# Patient Record
Sex: FEMALE | Race: WHITE | Employment: OTHER | URBAN - METROPOLITAN AREA
[De-identification: names, ages, dates, MRNs, and addresses within clinical notes are randomized per-mention and may not be internally consistent; named-entity substitution may affect disease eponyms.]

---

## 2017-04-20 ENCOUNTER — OFFICE VISIT (OUTPATIENT)
Dept: ORTHOPEDIC SURGERY | Age: 72
End: 2017-04-20

## 2017-04-20 VITALS
HEIGHT: 63 IN | RESPIRATION RATE: 16 BRPM | TEMPERATURE: 99.6 F | WEIGHT: 204 LBS | HEART RATE: 95 BPM | BODY MASS INDEX: 36.14 KG/M2 | SYSTOLIC BLOOD PRESSURE: 108 MMHG | DIASTOLIC BLOOD PRESSURE: 61 MMHG

## 2017-04-20 DIAGNOSIS — M17.11 PRIMARY OSTEOARTHRITIS OF RIGHT KNEE: ICD-10-CM

## 2017-04-20 DIAGNOSIS — Z96.643 H/O TOTAL HIP ARTHROPLASTY, BILATERAL: Primary | ICD-10-CM

## 2017-04-20 DIAGNOSIS — Z96.652 STATUS POST LEFT KNEE REPLACEMENT: ICD-10-CM

## 2017-04-20 PROCEDURE — 1036F TOBACCO NON-USER: CPT | Performed by: ORTHOPAEDIC SURGERY

## 2017-04-20 PROCEDURE — 73522 X-RAY EXAM HIPS BI 3-4 VIEWS: CPT | Performed by: ORTHOPAEDIC SURGERY

## 2017-04-20 PROCEDURE — 20610 DRAIN/INJ JOINT/BURSA W/O US: CPT | Performed by: ORTHOPAEDIC SURGERY

## 2017-04-20 PROCEDURE — G8400 PT W/DXA NO RESULTS DOC: HCPCS | Performed by: ORTHOPAEDIC SURGERY

## 2017-04-20 PROCEDURE — G8419 CALC BMI OUT NRM PARAM NOF/U: HCPCS | Performed by: ORTHOPAEDIC SURGERY

## 2017-04-20 PROCEDURE — 1123F ACP DISCUSS/DSCN MKR DOCD: CPT | Performed by: ORTHOPAEDIC SURGERY

## 2017-04-20 PROCEDURE — 1090F PRES/ABSN URINE INCON ASSESS: CPT | Performed by: ORTHOPAEDIC SURGERY

## 2017-04-20 PROCEDURE — 3014F SCREEN MAMMO DOC REV: CPT | Performed by: ORTHOPAEDIC SURGERY

## 2017-04-20 PROCEDURE — 3017F COLORECTAL CA SCREEN DOC REV: CPT | Performed by: ORTHOPAEDIC SURGERY

## 2017-04-20 PROCEDURE — G8427 DOCREV CUR MEDS BY ELIG CLIN: HCPCS | Performed by: ORTHOPAEDIC SURGERY

## 2017-04-20 PROCEDURE — 4040F PNEUMOC VAC/ADMIN/RCVD: CPT | Performed by: ORTHOPAEDIC SURGERY

## 2017-04-20 PROCEDURE — 99213 OFFICE O/P EST LOW 20 MIN: CPT | Performed by: ORTHOPAEDIC SURGERY

## 2017-04-20 PROCEDURE — 73562 X-RAY EXAM OF KNEE 3: CPT | Performed by: ORTHOPAEDIC SURGERY

## 2017-10-26 ENCOUNTER — OFFICE VISIT (OUTPATIENT)
Dept: ORTHOPEDIC SURGERY | Age: 72
End: 2017-10-26

## 2017-10-26 VITALS
SYSTOLIC BLOOD PRESSURE: 128 MMHG | HEART RATE: 90 BPM | HEIGHT: 63 IN | TEMPERATURE: 99.2 F | DIASTOLIC BLOOD PRESSURE: 66 MMHG | RESPIRATION RATE: 16 BRPM

## 2017-10-26 DIAGNOSIS — M17.11 ARTHRITIS OF RIGHT KNEE: Primary | ICD-10-CM

## 2017-10-26 PROCEDURE — 1090F PRES/ABSN URINE INCON ASSESS: CPT | Performed by: PHYSICIAN ASSISTANT

## 2017-10-26 PROCEDURE — 3017F COLORECTAL CA SCREEN DOC REV: CPT | Performed by: PHYSICIAN ASSISTANT

## 2017-10-26 PROCEDURE — G8484 FLU IMMUNIZE NO ADMIN: HCPCS | Performed by: PHYSICIAN ASSISTANT

## 2017-10-26 PROCEDURE — 3014F SCREEN MAMMO DOC REV: CPT | Performed by: PHYSICIAN ASSISTANT

## 2017-10-26 PROCEDURE — 4040F PNEUMOC VAC/ADMIN/RCVD: CPT | Performed by: PHYSICIAN ASSISTANT

## 2017-10-26 PROCEDURE — 1036F TOBACCO NON-USER: CPT | Performed by: PHYSICIAN ASSISTANT

## 2017-10-26 PROCEDURE — G8427 DOCREV CUR MEDS BY ELIG CLIN: HCPCS | Performed by: PHYSICIAN ASSISTANT

## 2017-10-26 PROCEDURE — 99214 OFFICE O/P EST MOD 30 MIN: CPT | Performed by: PHYSICIAN ASSISTANT

## 2017-10-26 PROCEDURE — 1123F ACP DISCUSS/DSCN MKR DOCD: CPT | Performed by: PHYSICIAN ASSISTANT

## 2017-10-26 PROCEDURE — G8400 PT W/DXA NO RESULTS DOC: HCPCS | Performed by: PHYSICIAN ASSISTANT

## 2017-10-26 PROCEDURE — G8421 BMI NOT CALCULATED: HCPCS | Performed by: PHYSICIAN ASSISTANT

## 2017-10-26 NOTE — LETTER
Supplemental Confidentiality & Payment Agreement & Supplemental HIPAA Notice for Shared Medical Appointments        During shared medical appointments, you will hear about other participants health issues and personal information. As a matter of trust, it is your duty to keep everything you hear confidential.  Nothing that identifies a participant in any way (including job, ethnicity, Mandaen, etc.) can be shared outside of this group setting. I have read and agree to the following statements:        · I agree to meet with a group of patients and my doctor. I understand that I have the choice to be seen by my physician in this group or individually and that I may leave the group visit anytime I feel uncomfortable. · I understand that discussions will occur regarding individual identifiable health information during the shared medical appointment and I will maintain the confidentiality of [facility] health information or other information heard during the appointment. · I am committed to maintaining this confidentiality even if I am no longer participating in shared medical appointments. · I understand that the information I share with the physician and staff will be heard by the other participants and there is a possibility that the information disclosed in the shared medical appointment may be re-disclosed by other participants. I have been notified of this potential disclosure and I voluntarily agree to participate in the shared medical appointment. · I know that I dont have to share any personal information with the group or health care providers unless, I choose to do so. · Like any doctors appointment, I agree to be responsible for the bill and / or co-payment associated with this physician appointment.               Shared Medical Appointment              THIS SUPPLEMENTAL NOTICE SUPPLEMENTS AND DOES NOT REPLACE THE [facility]

## 2017-10-26 NOTE — PROGRESS NOTES
This dictation was done with [employer] dictation and may contain mechanical errors related to translation. Subjective:  Bonne Kayser is a 81 y.o. who is here in follow-up for her right knee. She has ongoing pain at this is becoming more disabling and painful that it's affecting her everyday life. She had an injection a cortisone back on 4/20/2017, but no fractures within the last 5-6 months I did send her for x-rays including a standing AP lateral and a sunrise view. Patient Active Problem List   Diagnosis    Primary localized osteoarthrosis, pelvic region and thigh    Lower urinary tract infectious disease    Leg pain, left    Myalgia    S/P hip replacement    C. difficile diarrhea    Primary localized osteoarthrosis, pelvic region and thigh    H/O total hip arthroplasty    Status post left knee replacement    Arthritis of right knee           Current Outpatient Prescriptions on File Prior to Visit   Medication Sig Dispense Refill    esomeprazole Magnesium (NEXIUM) 20 MG PACK Take 20 mg by mouth daily      ferrous sulfate 220 (44 FE) MG/5ML solution Take 220 mg by mouth daily      Calcium Citrate-Vitamin D (CITRACAL + D PO) Take 1 capsule by mouth daily      rOPINIRole (REQUIP) 0.5 MG tablet Take 0.5 mg by mouth 3 times daily      rasagiline (AZILECT) 0.5 MG TABS Take 0.5 mg by mouth daily      losartan (COZAAR) 100 MG tablet Take 100 mg by mouth daily.  amLODIPine (NORVASC) 5 MG tablet Take 5 mg by mouth daily.  pravastatin (PRAVACHOL) 10 MG tablet Take 10 mg by mouth nightly. No current facility-administered medications on file prior to visit. Objective:   Blood pressure 128/66, pulse 90, temperature 99.2 °F (37.3 °C), temperature source Temporal, resp. rate 16, height 5' 3\" (1.6 m). On examination this is a very pleasant 82-year-old female no acute distress. She is alert and oriented ×3.  She has a lot of crepitus during flexion and extension through the

## 2017-11-27 ENCOUNTER — TELEPHONE (OUTPATIENT)
Dept: ORTHOPEDIC SURGERY | Age: 72
End: 2017-11-27

## 2017-11-27 ENCOUNTER — PAT TELEPHONE (OUTPATIENT)
Dept: PREADMISSION TESTING | Age: 72
End: 2017-11-27

## 2017-11-27 DIAGNOSIS — Z01.818 ENCOUNTER FOR PREADMISSION TESTING: Primary | ICD-10-CM

## 2017-11-28 ENCOUNTER — CARE COORDINATOR VISIT (OUTPATIENT)
Dept: CASE MANAGEMENT | Age: 72
End: 2017-11-28

## 2017-11-28 ENCOUNTER — HOSPITAL ENCOUNTER (OUTPATIENT)
Dept: PREADMISSION TESTING | Age: 72
Discharge: HOME | End: 2017-11-28
Attending: ORTHOPAEDIC SURGERY | Admitting: ORTHOPAEDIC SURGERY

## 2017-11-28 DIAGNOSIS — Z01.818 ENCOUNTER FOR PREADMISSION TESTING: ICD-10-CM

## 2017-11-28 LAB
ABO/RH: NORMAL
ALBUMIN SERPL-MCNC: 4.1 G/DL (ref 3.4–5)
ANION GAP SERPL CALCULATED.3IONS-SCNC: 16 MMOL/L (ref 3–16)
ANTIBODY SCREEN: NORMAL
APTT: 28.7 SEC (ref 24.1–34.9)
BACTERIA: ABNORMAL /HPF
BASOPHILS ABSOLUTE: 0 K/UL (ref 0–0.2)
BASOPHILS RELATIVE PERCENT: 0.6 %
BILIRUBIN URINE: NEGATIVE
BLOOD, URINE: ABNORMAL
BUN BLDV-MCNC: 16 MG/DL (ref 7–20)
CALCIUM SERPL-MCNC: 9.6 MG/DL (ref 8.3–10.6)
CHLORIDE BLD-SCNC: 104 MMOL/L (ref 99–110)
CLARITY: CLEAR
CO2: 26 MMOL/L (ref 21–32)
COLOR: YELLOW
CREAT SERPL-MCNC: 0.7 MG/DL (ref 0.6–1.2)
EKG ATRIAL RATE: 66 BPM
EKG DIAGNOSIS: NORMAL
EKG P AXIS: 42 DEGREES
EKG P-R INTERVAL: 128 MS
EKG Q-T INTERVAL: 422 MS
EKG QRS DURATION: 72 MS
EKG QTC CALCULATION (BAZETT): 442 MS
EKG R AXIS: -6 DEGREES
EKG T AXIS: 17 DEGREES
EKG VENTRICULAR RATE: 66 BPM
EOSINOPHILS ABSOLUTE: 0.1 K/UL (ref 0–0.6)
EOSINOPHILS RELATIVE PERCENT: 1 %
EPITHELIAL CELLS, UA: 3 /HPF (ref 0–5)
ESTIMATED AVERAGE GLUCOSE: 137 MG/DL
GFR AFRICAN AMERICAN: >60
GFR NON-AFRICAN AMERICAN: >60
GLUCOSE BLD-MCNC: 134 MG/DL (ref 70–99)
GLUCOSE URINE: NEGATIVE MG/DL
HBA1C MFR BLD: 6.4 %
HCT VFR BLD CALC: 40.7 % (ref 36–48)
HEMOGLOBIN: 13.7 G/DL (ref 12–16)
HYALINE CASTS: 0 /LPF (ref 0–8)
INR BLD: 0.95 (ref 0.85–1.15)
KETONES, URINE: NEGATIVE MG/DL
LEUKOCYTE ESTERASE, URINE: ABNORMAL
LYMPHOCYTES ABSOLUTE: 1.5 K/UL (ref 1–5.1)
LYMPHOCYTES RELATIVE PERCENT: 18.3 %
MCH RBC QN AUTO: 29.7 PG (ref 26–34)
MCHC RBC AUTO-ENTMCNC: 33.6 G/DL (ref 31–36)
MCV RBC AUTO: 88.5 FL (ref 80–100)
MICROSCOPIC EXAMINATION: YES
MONOCYTES ABSOLUTE: 0.6 K/UL (ref 0–1.3)
MONOCYTES RELATIVE PERCENT: 7.2 %
NEUTROPHILS ABSOLUTE: 5.9 K/UL (ref 1.7–7.7)
NEUTROPHILS RELATIVE PERCENT: 72.9 %
NITRITE, URINE: NEGATIVE
PDW BLD-RTO: 13.1 % (ref 12.4–15.4)
PH UA: 6
PLATELET # BLD: 259 K/UL (ref 135–450)
PMV BLD AUTO: 7.6 FL (ref 5–10.5)
POTASSIUM SERPL-SCNC: 4.3 MMOL/L (ref 3.5–5.1)
PREALBUMIN: 20 MG/DL (ref 20–40)
PROTEIN UA: NEGATIVE MG/DL
PROTHROMBIN TIME: 10.7 SEC (ref 9.6–13)
RBC # BLD: 4.6 M/UL (ref 4–5.2)
RBC UA: 3 /HPF (ref 0–4)
SEDIMENTATION RATE, ERYTHROCYTE: 7 MM/HR (ref 0–30)
SODIUM BLD-SCNC: 146 MMOL/L (ref 136–145)
SPECIFIC GRAVITY UA: 1.02
URINE REFLEX TO CULTURE: YES
URINE TYPE: ABNORMAL
UROBILINOGEN, URINE: 1 E.U./DL
VITAMIN D 25-HYDROXY: 25.2 NG/ML
WBC # BLD: 8.1 K/UL (ref 4–11)
WBC UA: 16 /HPF (ref 0–5)

## 2017-11-28 PROCEDURE — 93010 ELECTROCARDIOGRAM REPORT: CPT | Performed by: INTERNAL MEDICINE

## 2017-11-28 NOTE — CARE COORDINATION
[facility] Joint Replacement Ortho Bundle Transition Interview    Patient Interviewed: Chaim Baron  Informed patient of [facility] Ortho Bundle Program and role of CTS/CTC - yes  CMS Letter Given:  11/28/2017 in JET class    Living Situation:   Living arrangements: with family:  spouse                   Home: 1-story house/ trailer. Ramp to enter the dwelling. Social support:a good social support network    Risk Analysis:  Has the following:   drives   Chronic Illness: Osteoarthritis, h/o uti, and leg pain    Fall Risk & DME:   Any previous falls or injuries in the home? No   Visual Impairment:  Glasses   Difficulty hearing: hard of hearing, Wears hearing aids   Able to express needs/desires? Yes   Home Equipment: shower chair with back, grab bars in the shower, hand held shower, grab bars near the toilet, lift chair, 2 wheel walker. Financial Assessment   Afford medications? Yes   Connected with the following services? none    Mode of transportation? car    Health Literacy Assessment   Does anyone help with medications at home? No  Anything preventing from taking medications at home? No    Anticipated Needs Post Evaluation: Payton's goal at time of d/c is to return home with Neetu Jasmina Garcia is currently independent with ambulation,bathing, dressing,cooking, cleaning, and the laundry.     Follow up appointments:    Future Appointments  Date Time Provider Jose Miguel Ruiz   11/30/2017 1:30 PM Alisha Clifford MD [facility] [place]   12/8/2017 7:35 AM MD Violette Andino   12/8/2017 9:55 AM Alisha Clifford MD [place] [facility] None   12/28/2017 2:00 PM UNA Hoffmann [facility]

## 2017-11-28 NOTE — PROGRESS NOTES
Pt. Attended JET class on 11/28/17. Pt. Verified surgery for Total Knee replacement and received patient information and educational JET folder. Interviews completed by PT, OT, Case management and PAT. Labs and Tests completed as ordered/necessary. Pt. Given instructions on Pre-operative Showering techniques and the use of anti-septic 3 days before surgery. Anti-septic bottle given to patient to take home. Pt. States no further questions or concerns.

## 2017-11-29 LAB — MRSA SCREEN RT-PCR: NORMAL

## 2017-11-30 ENCOUNTER — OFFICE VISIT (OUTPATIENT)
Dept: ORTHOPEDIC SURGERY | Age: 72
End: 2017-11-30

## 2017-11-30 DIAGNOSIS — M17.11 PRIMARY OSTEOARTHRITIS OF RIGHT KNEE: Primary | ICD-10-CM

## 2017-11-30 LAB
ORGANISM: ABNORMAL
URINE CULTURE, ROUTINE: ABNORMAL
URINE CULTURE, ROUTINE: ABNORMAL

## 2017-11-30 PROCEDURE — 4040F PNEUMOC VAC/ADMIN/RCVD: CPT | Performed by: ORTHOPAEDIC SURGERY

## 2017-11-30 PROCEDURE — G8400 PT W/DXA NO RESULTS DOC: HCPCS | Performed by: ORTHOPAEDIC SURGERY

## 2017-11-30 PROCEDURE — G8421 BMI NOT CALCULATED: HCPCS | Performed by: ORTHOPAEDIC SURGERY

## 2017-11-30 PROCEDURE — 1036F TOBACCO NON-USER: CPT | Performed by: ORTHOPAEDIC SURGERY

## 2017-11-30 PROCEDURE — 99214 OFFICE O/P EST MOD 30 MIN: CPT | Performed by: ORTHOPAEDIC SURGERY

## 2017-11-30 PROCEDURE — G8427 DOCREV CUR MEDS BY ELIG CLIN: HCPCS | Performed by: ORTHOPAEDIC SURGERY

## 2017-11-30 PROCEDURE — 1123F ACP DISCUSS/DSCN MKR DOCD: CPT | Performed by: ORTHOPAEDIC SURGERY

## 2017-11-30 PROCEDURE — G8484 FLU IMMUNIZE NO ADMIN: HCPCS | Performed by: ORTHOPAEDIC SURGERY

## 2017-11-30 PROCEDURE — 3014F SCREEN MAMMO DOC REV: CPT | Performed by: ORTHOPAEDIC SURGERY

## 2017-11-30 PROCEDURE — 3017F COLORECTAL CA SCREEN DOC REV: CPT | Performed by: ORTHOPAEDIC SURGERY

## 2017-11-30 PROCEDURE — 1090F PRES/ABSN URINE INCON ASSESS: CPT | Performed by: ORTHOPAEDIC SURGERY

## 2017-11-30 RX ORDER — CIPROFLOXACIN 500 MG/1
500 TABLET, FILM COATED ORAL 2 TIMES DAILY
Qty: 20 TABLET | Refills: 0 | Status: ON HOLD | OUTPATIENT
Start: 2017-11-30 | End: 2017-12-09 | Stop reason: HOSPADM

## 2017-12-07 ENCOUNTER — PAT TELEPHONE (OUTPATIENT)
Dept: PREADMISSION TESTING | Age: 72
End: 2017-12-07

## 2017-12-07 ENCOUNTER — HOSPITAL ENCOUNTER (OUTPATIENT)
Dept: WOMENS IMAGING | Age: 72
Discharge: HOME | End: 2017-12-07
Attending: SURGERY | Admitting: SURGERY

## 2017-12-07 VITALS — BODY MASS INDEX: 36.68 KG/M2 | WEIGHT: 207 LBS | HEIGHT: 63 IN

## 2017-12-07 DIAGNOSIS — Z12.31 VISIT FOR SCREENING MAMMOGRAM: ICD-10-CM

## 2017-12-07 RX ORDER — CELECOXIB 200 MG/1
200 CAPSULE ORAL ONCE
Status: CANCELLED | OUTPATIENT
Start: 2017-12-08

## 2017-12-07 RX ORDER — METFORMIN HYDROCHLORIDE 500 MG/1
1000 TABLET, EXTENDED RELEASE ORAL
COMMUNITY
Start: 2017-08-31

## 2017-12-07 NOTE — PRE-PROCEDURE INSTRUCTIONS
C-Difficile admission screening and protocol:     * Admitted with diarrhea? yes     *Prior history of C-Diff. In last 3 months? yes     *Antibiotic use in the past 6-8 weeks? yes UTI     *Prior hospitalization or nursing home in the last month?    no
you have a living will or durable power of attorny. Please bring in a copy of you advanced directives. · If you have dentures, they will be removed before going to the OR, we will provide you with a container. If you wear contact lenses/glasses, they will be removes, please bring a case    · Have you seen your family doctor for a pre-op history and physical.      · Surgery scheduler will call you 48 hours prior to your surgery to notify you of the time of your surgery and the time you will need to be at hospital...patients are asked to arrive 2 1/2 hours prior to surgery. ·  Please call Pre-Admission testing if you have any further questions.                   [id number] [facility] testing phone number:  [phone]      Thank You for choosing [facility]!!

## 2017-12-08 PROBLEM — M17.11 ARTHRITIS OF RIGHT KNEE: Status: ACTIVE | Noted: 2017-12-08

## 2017-12-10 ENCOUNTER — CARE COORDINATION (OUTPATIENT)
Dept: CASE MANAGEMENT | Age: 72
End: 2017-12-10

## 2017-12-18 ENCOUNTER — TELEPHONE (OUTPATIENT)
Dept: ORTHOPEDIC SURGERY | Age: 72
End: 2017-12-18

## 2017-12-18 RX ORDER — HYDROCODONE BITARTRATE AND ACETAMINOPHEN 5; 325 MG/1; MG/1
1 TABLET ORAL EVERY 6 HOURS PRN
Qty: 60 TABLET | Refills: 0 | Status: SHIPPED | OUTPATIENT
Start: 2017-12-18

## 2017-12-18 NOTE — TELEPHONE ENCOUNTER
Patient needs a refill of Norco 5/325 mg, last filled 12/9/17( patient is allergic to [facility])    Preferred pharmacy- [facility]- [phone]    Patient can be reached at [phone]

## 2017-12-19 ENCOUNTER — CARE COORDINATION (OUTPATIENT)
Dept: CASE MANAGEMENT | Age: 72
End: 2017-12-19

## 2017-12-19 NOTE — CARE COORDINATION
Spoke with Diego Gonzalez. Incision status: States her incision is looking really good. Pain level and status: States it's under control, about a 3 currently. Use of pain medications: Reports a refill has been sent to her pharmacy but insurance won't approve until tomorrow, so her  will pick it up then. States she has 3 pills left from her prior prescription in the meantime. Bowels: States her BMs are becoming more regular and she is taking colace. Home Care Agency active: [facility]. Outpatient therapy: NA    Do you have all of your medications: yes, states she has everything that she needs and denies any questions or concerns at this time. Encouraged pt to call if questions or issues arise.      Follow up appointments:    Future Appointments  Date Time Provider Jose Miguel Ruiz   12/28/2017 2:00 PM Ranelle Leyden, [place] [facility] [address]   [phone]

## 2017-12-28 ENCOUNTER — OFFICE VISIT (OUTPATIENT)
Dept: ORTHOPEDIC SURGERY | Age: 72
End: 2017-12-28

## 2017-12-28 VITALS — HEIGHT: 62 IN | BODY MASS INDEX: 37.17 KG/M2 | WEIGHT: 202 LBS | RESPIRATION RATE: 16 BRPM

## 2017-12-28 DIAGNOSIS — Z96.651 STATUS POST TOTAL RIGHT KNEE REPLACEMENT: Primary | ICD-10-CM

## 2017-12-28 PROCEDURE — 99024 POSTOP FOLLOW-UP VISIT: CPT | Performed by: PHYSICIAN ASSISTANT

## 2018-01-03 ENCOUNTER — CARE COORDINATION (OUTPATIENT)
Dept: CASE MANAGEMENT | Age: 73
End: 2018-01-03

## 2018-01-08 NOTE — PROGRESS NOTES
This dictation was done with [employer] dictation and may contain mechanical errors related to translation. Resp. rate 16, height 5' 2\" (1.575 m), weight 202 lb (91.6 kg). This is a very pleasant 70-year-old female in no acute distress. She is here postop for her right total knee replacement from 12/8/2017. She had been doing very well and then I think she feels like she overdid because she has some soreness and to the back of her calf's. Heart is negative for Homans her Marino's test but she has residual swelling from being on her feet for too much. I initially sent her for x-rays including a standing AP lateral and a sunrise view. Xray three views of the total knee arthroplasty reveals satisfactory alignment of the prosthesis . No signs of significant polyethylene wear or failure. No progressive radiolucencies,fractures, tumors or dislocations. On examination this is a well-developed, 70-year-old female no acute distress. She is alert and oriented ×3. She is neurologically intact her right foot with good dorsiflexion, plantar flexion strength. No cutaneous lesions or lymphadenopathy. The incision looks good no signs of infection. We went over wound care. She has approximate 2-115° of motion. My impression is stable healing right total knee replacement.     Our plan is to proceed to outpatient physical therapy progressively increase activities and we will see her in follow-up in 3-4 weeks

## 2018-01-16 ENCOUNTER — CARE COORDINATION (OUTPATIENT)
Dept: CASE MANAGEMENT | Age: 73
End: 2018-01-16

## 2018-01-16 NOTE — CARE COORDINATION
Attempted to reach pt for [facility] ortho bundle follow up call. Unable to leave message. Will attempt again later.       Follow up appointments:    Future Appointments  Date Time Provider Jose Miguel Ruiz   1/18/2018 1:30 PM UNA Pagan W [facility] [address]   [phone]

## 2018-01-18 ENCOUNTER — OFFICE VISIT (OUTPATIENT)
Dept: ORTHOPEDIC SURGERY | Age: 73
End: 2018-01-18

## 2018-01-18 VITALS — HEIGHT: 62 IN | TEMPERATURE: 98.6 F | BODY MASS INDEX: 36.8 KG/M2 | WEIGHT: 200 LBS | RESPIRATION RATE: 16 BRPM

## 2018-01-18 DIAGNOSIS — Z96.651 STATUS POST TOTAL RIGHT KNEE REPLACEMENT: Primary | ICD-10-CM

## 2018-01-18 PROCEDURE — 99024 POSTOP FOLLOW-UP VISIT: CPT | Performed by: PHYSICIAN ASSISTANT

## 2018-02-10 ENCOUNTER — CARE COORDINATION (OUTPATIENT)
Dept: CASE MANAGEMENT | Age: 73
End: 2018-02-10

## 2018-03-01 ENCOUNTER — OFFICE VISIT (OUTPATIENT)
Dept: ORTHOPEDIC SURGERY | Age: 73
End: 2018-03-01

## 2018-03-01 VITALS
DIASTOLIC BLOOD PRESSURE: 60 MMHG | HEART RATE: 77 BPM | TEMPERATURE: 97.9 F | RESPIRATION RATE: 16 BRPM | SYSTOLIC BLOOD PRESSURE: 129 MMHG

## 2018-03-01 DIAGNOSIS — M17.11 ARTHRITIS OF RIGHT KNEE: Primary | ICD-10-CM

## 2018-03-01 DIAGNOSIS — Z96.651 STATUS POST TOTAL RIGHT KNEE REPLACEMENT: ICD-10-CM

## 2018-03-01 PROCEDURE — 99024 POSTOP FOLLOW-UP VISIT: CPT | Performed by: PHYSICIAN ASSISTANT

## 2018-03-01 NOTE — PROGRESS NOTES
Subjective:      Patient ID: Valerie Torres is a 81 y.o. female. Chief Complaint   Patient presents with    Follow-up     R TKA DOS: 12/8/17        HPI:   She for follow up visit. S/P right knee arthroplasty. The date of procedure: 12/8/17. Pain is 3-4/10. Pain medication is controlling the pain. Review of Systems:   She denies fever or chills. She  denies any other complaints. Past Medical History:   Diagnosis Date    Achalasia     Anemia     Arthritis     Breast cancer ([facility] [address] 2011    left breast mastectomy    Clostridium difficile infection 2/25/14    Diabetes mellitus ([facility] [address]     diet controlled    HBP (high blood pressure)     Hearing impairment     Hyperlipidemia     Nausea & vomiting     PONV (postoperative nausea and vomiting)     Sinus disorder     Tremor of both hands     Wears glasses     Wears hearing aid     bilat    Weight disorder        Family History   Problem Relation Age of Onset    Arthritis Other     Cancer Other     Heart Disease Other     High Blood Pressure Other     Stroke Other        Past Surgical History:   Procedure Laterality Date    BREAST LUMPECTOMY  2011    BREAST SURGERY  left masectomy    ESOPHAGUS SURGERY      HAND SURGERY Right     HYSTERECTOMY      JOINT REPLACEMENT  left knee replacement 2007    JOINT REPLACEMENT Right 1/31/14    Right total hip replacement    JOINT REPLACEMENT Left 12/2014    hip     JOINT REPLACEMENT Right     SHOULDER SURGERY      TONSILLECTOMY         Social History     Occupational History    Not on file. Social History Main Topics    Smoking status: Never Smoker    Smokeless tobacco: Never Used    Alcohol use No    Drug use: No    Sexual activity: Not on file       Current Outpatient Prescriptions   Medication Sig Dispense Refill    HYDROcodone-acetaminophen (NORCO) 5-325 MG per tablet Take 1 tablet by mouth every 6 hours as needed for Pain .  60 tablet 0    apixaban (ELIQUIS) 2.5 MG TABS

## 2018-03-08 ENCOUNTER — CARE COORDINATION (OUTPATIENT)
Dept: CASE MANAGEMENT | Age: 73
End: 2018-03-08

## 2018-03-08 NOTE — CARE COORDINATION
Attempted to reach pt for final [facility] ortho bundle follow up call. Left message requesting call back with questions or concerns.      Ismael [address]   [phone]

## 2018-04-26 ENCOUNTER — OFFICE VISIT (OUTPATIENT)
Dept: CARDIOLOGY CLINIC | Age: 73
End: 2018-04-26

## 2018-04-26 VITALS
SYSTOLIC BLOOD PRESSURE: 128 MMHG | HEART RATE: 71 BPM | BODY MASS INDEX: 36.99 KG/M2 | HEIGHT: 62 IN | OXYGEN SATURATION: 98 % | WEIGHT: 201 LBS | DIASTOLIC BLOOD PRESSURE: 71 MMHG

## 2018-04-26 DIAGNOSIS — I10 ESSENTIAL HYPERTENSION: ICD-10-CM

## 2018-04-26 DIAGNOSIS — E78.2 MIXED HYPERLIPIDEMIA: ICD-10-CM

## 2018-04-26 DIAGNOSIS — R06.02 SOB (SHORTNESS OF BREATH): Primary | ICD-10-CM

## 2018-04-26 PROCEDURE — G8400 PT W/DXA NO RESULTS DOC: HCPCS | Performed by: INTERNAL MEDICINE

## 2018-04-26 PROCEDURE — 1036F TOBACCO NON-USER: CPT | Performed by: INTERNAL MEDICINE

## 2018-04-26 PROCEDURE — 93000 ELECTROCARDIOGRAM COMPLETE: CPT | Performed by: INTERNAL MEDICINE

## 2018-04-26 PROCEDURE — 3017F COLORECTAL CA SCREEN DOC REV: CPT | Performed by: INTERNAL MEDICINE

## 2018-04-26 PROCEDURE — G8427 DOCREV CUR MEDS BY ELIG CLIN: HCPCS | Performed by: INTERNAL MEDICINE

## 2018-04-26 PROCEDURE — 99204 OFFICE O/P NEW MOD 45 MIN: CPT | Performed by: INTERNAL MEDICINE

## 2018-04-26 PROCEDURE — 4040F PNEUMOC VAC/ADMIN/RCVD: CPT | Performed by: INTERNAL MEDICINE

## 2018-04-26 PROCEDURE — G8417 CALC BMI ABV UP PARAM F/U: HCPCS | Performed by: INTERNAL MEDICINE

## 2018-04-26 PROCEDURE — 1090F PRES/ABSN URINE INCON ASSESS: CPT | Performed by: INTERNAL MEDICINE

## 2018-04-26 PROCEDURE — 1123F ACP DISCUSS/DSCN MKR DOCD: CPT | Performed by: INTERNAL MEDICINE

## 2018-04-26 RX ORDER — PRAVASTATIN SODIUM 20 MG
20 TABLET ORAL NIGHTLY
Qty: 30 TABLET | Refills: 0 | COMMUNITY
Start: 2018-04-26

## 2018-07-12 ENCOUNTER — OFFICE VISIT (OUTPATIENT)
Dept: ORTHOPEDIC SURGERY | Age: 73
End: 2018-07-12

## 2018-07-12 VITALS
HEART RATE: 71 BPM | TEMPERATURE: 97.5 F | WEIGHT: 196 LBS | DIASTOLIC BLOOD PRESSURE: 69 MMHG | BODY MASS INDEX: 36.07 KG/M2 | HEIGHT: 62 IN | SYSTOLIC BLOOD PRESSURE: 134 MMHG

## 2018-07-12 DIAGNOSIS — Z96.642 HISTORY OF TOTAL HIP ARTHROPLASTY, LEFT: ICD-10-CM

## 2018-07-12 DIAGNOSIS — Z96.641 HISTORY OF TOTAL HIP ARTHROPLASTY, RIGHT: ICD-10-CM

## 2018-07-12 DIAGNOSIS — Z96.652 HISTORY OF TOTAL KNEE ARTHROPLASTY, LEFT: ICD-10-CM

## 2018-07-12 DIAGNOSIS — Z96.651 HISTORY OF TOTAL KNEE ARTHROPLASTY, RIGHT: Primary | ICD-10-CM

## 2018-07-12 PROCEDURE — 1090F PRES/ABSN URINE INCON ASSESS: CPT | Performed by: PHYSICIAN ASSISTANT

## 2018-07-12 PROCEDURE — G8428 CUR MEDS NOT DOCUMENT: HCPCS | Performed by: PHYSICIAN ASSISTANT

## 2018-07-12 PROCEDURE — 1101F PT FALLS ASSESS-DOCD LE1/YR: CPT | Performed by: PHYSICIAN ASSISTANT

## 2018-07-12 PROCEDURE — 4040F PNEUMOC VAC/ADMIN/RCVD: CPT | Performed by: PHYSICIAN ASSISTANT

## 2018-07-12 PROCEDURE — 3017F COLORECTAL CA SCREEN DOC REV: CPT | Performed by: PHYSICIAN ASSISTANT

## 2018-07-12 PROCEDURE — G8400 PT W/DXA NO RESULTS DOC: HCPCS | Performed by: PHYSICIAN ASSISTANT

## 2018-07-12 PROCEDURE — G8417 CALC BMI ABV UP PARAM F/U: HCPCS | Performed by: PHYSICIAN ASSISTANT

## 2018-07-12 PROCEDURE — 1123F ACP DISCUSS/DSCN MKR DOCD: CPT | Performed by: PHYSICIAN ASSISTANT

## 2018-07-12 PROCEDURE — 99212 OFFICE O/P EST SF 10 MIN: CPT | Performed by: PHYSICIAN ASSISTANT

## 2018-07-12 PROCEDURE — 1036F TOBACCO NON-USER: CPT | Performed by: PHYSICIAN ASSISTANT

## 2018-07-12 NOTE — PROGRESS NOTES
arthroplasty present. The alignment is satisfactory. There are no signs of failure or loosening. Diagnosis:        ICD-10-CM ICD-9-CM    1. History of total knee arthroplasty, right-12.8.2017 Z96.651 V43.65 XR Knee Bilateral Standing      XR KNEE RIGHT (1-2 VIEWS)   2. History of total hip arthroplasty, right-1.31.2014 Z96.641 V43.64 XR HIP 3-4 VW W PELVIS BILATERAL   3. History of total hip arthroplasty, left-12.2014 Z96.642 V43.64 XR HIP 3-4 VW W PELVIS BILATERAL   4. History of total knee arthroplasty, left-2007 Z96.652 V43.65 XR Knee Bilateral Standing      XR KNEE LEFT (1-2 VIEWS)        Assessment/ Plan:      The natural history of the patient's diagnosis as well as the treatment options were discussed in full and questions were answered. Risks and benefits of the treatment options also reviewed in detail. Clinically and radiographically stable bilateral hip and knee arthroplasties. Discussed dental prophylaxis for dental cleaning or procedures. Continue home exercise program.        Follow Up: Annually with x-rays and clinical exam of both hips and both knees. Call or return to clinic prn if these symptoms worsen or fail to improve as anticipated.

## 2018-12-13 ENCOUNTER — HOSPITAL ENCOUNTER (OUTPATIENT)
Dept: WOMENS IMAGING | Age: 73
Discharge: HOME | End: 2018-12-13
Payer: MEDICARE

## 2018-12-13 DIAGNOSIS — Z12.31 VISIT FOR SCREENING MAMMOGRAM: ICD-10-CM

## 2018-12-13 PROCEDURE — 77067 SCR MAMMO BI INCL CAD: CPT

## 2019-07-11 ENCOUNTER — OFFICE VISIT (OUTPATIENT)
Dept: ORTHOPEDIC SURGERY | Age: 74
End: 2019-07-11
Payer: MEDICARE

## 2019-07-11 VITALS
SYSTOLIC BLOOD PRESSURE: 106 MMHG | HEART RATE: 78 BPM | BODY MASS INDEX: 36.07 KG/M2 | TEMPERATURE: 97.6 F | WEIGHT: 196 LBS | DIASTOLIC BLOOD PRESSURE: 61 MMHG | HEIGHT: 62 IN

## 2019-07-11 DIAGNOSIS — Z96.642 HISTORY OF TOTAL HIP ARTHROPLASTY, LEFT: ICD-10-CM

## 2019-07-11 DIAGNOSIS — Z96.651 HISTORY OF TOTAL KNEE ARTHROPLASTY, RIGHT: Primary | ICD-10-CM

## 2019-07-11 DIAGNOSIS — Z96.652 HISTORY OF TOTAL KNEE ARTHROPLASTY, LEFT: ICD-10-CM

## 2019-07-11 DIAGNOSIS — Z96.641 HISTORY OF TOTAL HIP ARTHROPLASTY, RIGHT: ICD-10-CM

## 2019-07-11 PROCEDURE — 4040F PNEUMOC VAC/ADMIN/RCVD: CPT | Performed by: PHYSICIAN ASSISTANT

## 2019-07-11 PROCEDURE — 99212 OFFICE O/P EST SF 10 MIN: CPT | Performed by: PHYSICIAN ASSISTANT

## 2019-07-11 PROCEDURE — 3017F COLORECTAL CA SCREEN DOC REV: CPT | Performed by: PHYSICIAN ASSISTANT

## 2019-07-11 PROCEDURE — 1090F PRES/ABSN URINE INCON ASSESS: CPT | Performed by: PHYSICIAN ASSISTANT

## 2019-07-11 PROCEDURE — 1123F ACP DISCUSS/DSCN MKR DOCD: CPT | Performed by: PHYSICIAN ASSISTANT

## 2019-07-11 PROCEDURE — 1036F TOBACCO NON-USER: CPT | Performed by: PHYSICIAN ASSISTANT

## 2019-07-11 PROCEDURE — G8427 DOCREV CUR MEDS BY ELIG CLIN: HCPCS | Performed by: PHYSICIAN ASSISTANT

## 2019-07-11 PROCEDURE — G8400 PT W/DXA NO RESULTS DOC: HCPCS | Performed by: PHYSICIAN ASSISTANT

## 2019-07-11 PROCEDURE — G8417 CALC BMI ABV UP PARAM F/U: HCPCS | Performed by: PHYSICIAN ASSISTANT

## 2019-07-13 NOTE — PROGRESS NOTES
Subjective:      Patient ID: Yehuda Delgado is a 72 y.o.  female. Chief Complaint   Patient presents with    Hip Problem     Left GRACIA 12/2014    Hip Problem     Right GRACIA 1.31.2014    Knee Problem     Left TKA 2007    Knee Problem     Right TKA 12.8.2017        HPI: She is here for follow-up on her bilateral hip and bilateral knee arthroplasties. She states both knees as well as both hips are doing well. No issues, complaints. Review of Systems:   Negative for fever or chills.       Past Medical History:   Diagnosis Date    Achalasia     Anemia     Arthritis     Breast cancer ([facility] [address] 2011    left breast mastectomy    Clostridium difficile infection 2/25/14    Diabetes mellitus ([facility] [address]     diet controlled    HBP (high blood pressure)     Hearing impairment     Hyperlipidemia     Nausea & vomiting     PONV (postoperative nausea and vomiting)     Sinus disorder     Tremor of both hands     Wears glasses     Wears hearing aid     bilat    Weight disorder        Family History   Problem Relation Age of Onset    Arthritis Other     Cancer Other     Heart Disease Other     High Blood Pressure Other     Stroke Other        Past Surgical History:   Procedure Laterality Date    BREAST LUMPECTOMY  2011    BREAST SURGERY  left masectomy    ESOPHAGUS SURGERY      HAND SURGERY Right     HYSTERECTOMY      JOINT REPLACEMENT  left knee replacement 2007    JOINT REPLACEMENT Right 1/31/14    Right total hip replacement    JOINT REPLACEMENT Left 12/2014    hip     JOINT REPLACEMENT Right     SHOULDER SURGERY      TONSILLECTOMY         Social History     Occupational History    Not on file   Tobacco Use    Smoking status: Never Smoker    Smokeless tobacco: Never Used   Substance and Sexual Activity    Alcohol use: No     Alcohol/week: 0.0 standard drinks    Drug use: No    Sexual activity: Not on file       Current Outpatient Medications   Medication Sig Dispense Refill    pravastatin

## 2020-07-16 ENCOUNTER — OFFICE VISIT (OUTPATIENT)
Dept: ORTHOPEDIC SURGERY | Age: 75
End: 2020-07-16
Payer: MEDICARE

## 2020-07-16 VITALS — TEMPERATURE: 97 F

## 2020-07-16 PROBLEM — M79.671 RIGHT FOOT PAIN: Status: ACTIVE | Noted: 2020-07-16

## 2020-07-16 PROCEDURE — G8421 BMI NOT CALCULATED: HCPCS | Performed by: PHYSICIAN ASSISTANT

## 2020-07-16 PROCEDURE — G8428 CUR MEDS NOT DOCUMENT: HCPCS | Performed by: PHYSICIAN ASSISTANT

## 2020-07-16 PROCEDURE — 1036F TOBACCO NON-USER: CPT | Performed by: PHYSICIAN ASSISTANT

## 2020-07-16 PROCEDURE — G8400 PT W/DXA NO RESULTS DOC: HCPCS | Performed by: PHYSICIAN ASSISTANT

## 2020-07-16 PROCEDURE — 99214 OFFICE O/P EST MOD 30 MIN: CPT | Performed by: PHYSICIAN ASSISTANT

## 2020-07-16 PROCEDURE — 3017F COLORECTAL CA SCREEN DOC REV: CPT | Performed by: PHYSICIAN ASSISTANT

## 2020-07-16 PROCEDURE — 1123F ACP DISCUSS/DSCN MKR DOCD: CPT | Performed by: PHYSICIAN ASSISTANT

## 2020-07-16 PROCEDURE — 4040F PNEUMOC VAC/ADMIN/RCVD: CPT | Performed by: PHYSICIAN ASSISTANT

## 2020-07-16 PROCEDURE — 1090F PRES/ABSN URINE INCON ASSESS: CPT | Performed by: PHYSICIAN ASSISTANT

## 2020-07-16 PROCEDURE — L3260 AMBULATORY SURGICAL BOOT EAC: HCPCS | Performed by: PHYSICIAN ASSISTANT

## 2020-07-16 RX ORDER — CEPHALEXIN 500 MG/1
CAPSULE ORAL
Qty: 4 CAPSULE | Refills: 3 | Status: SHIPPED | OUTPATIENT
Start: 2020-07-16 | End: 2020-10-22

## 2020-07-16 NOTE — PROGRESS NOTES
Subjective:      Patient ID: Sam Pisano is a 69 y.o.  female. Chief Complaint   Patient presents with    Knee Problem     Left TKA 2007    Knee Problem     Right TKA 12.8.2017    Hip Problem     Left GRACIA 12.2014    Hip Problem     Right GRACIA 1.31.2014        HPI:   She is here for an initial evaluation of right foot pain. Onset of symptoms 10 days. These symptoms have been progressive in nature. She does not recall a specific injury. Pain is located over the metatarsal region. Pain is on average 8/10. Pain is worse with weight bearing and the pain improves with elevation. There is not associated numbness/ tingling. Previous treatments have included: Ice, elevation with no  relief or improvement. She is also here for follow-up on her bilateral hip and bilateral knee arthroplasties. All are doing well, no issues or complaints. Review of Systems:   A 14 point review of systems and history form completed by the patient has been reviewed. This form is scanned in the media tab of the patient's chart under today's date.       Past Medical History:   Diagnosis Date    Achalasia     Anemia     Arthritis     Breast cancer ([facility] [address] 2011    left breast mastectomy    Clostridium difficile infection 2/25/14    Diabetes mellitus ([facility] [address]     diet controlled    HBP (high blood pressure)     Hearing impairment     Hyperlipidemia     Nausea & vomiting     PONV (postoperative nausea and vomiting)     Sinus disorder     Tremor of both hands     Wears glasses     Wears hearing aid     bilat    Weight disorder        Family History   Problem Relation Age of Onset    Arthritis Other     Cancer Other     Heart Disease Other     High Blood Pressure Other     Stroke Other        Past Surgical History:   Procedure Laterality Date    BREAST LUMPECTOMY  2011    BREAST SURGERY  left masectomy    ESOPHAGUS SURGERY      HAND SURGERY Right     HYSTERECTOMY      JOINT REPLACEMENT  left knee replacement 2007    JOINT REPLACEMENT Right 1/31/14    Right total hip replacement    JOINT REPLACEMENT Left 12/2014    hip     JOINT REPLACEMENT Right     SHOULDER SURGERY      TONSILLECTOMY         Social History     Occupational History    Not on file   Tobacco Use    Smoking status: Never Smoker    Smokeless tobacco: Never Used   Substance and Sexual Activity    Alcohol use: No     Alcohol/week: 0.0 standard drinks    Drug use: No    Sexual activity: Not on file       Current Outpatient Medications   Medication Sig Dispense Refill    pravastatin (PRAVACHOL) 20 MG tablet Take 1 tablet by mouth nightly 30 tablet 0    HYDROcodone-acetaminophen (NORCO) 5-325 MG per tablet Take 1 tablet by mouth every 6 hours as needed for Pain . 60 tablet 0    metFORMIN (GLUCOPHAGE-XR) 500 MG extended release tablet Take 1,000 mg by mouth      triamcinolone (KENALOG) 0.1 % ointment APPLY TO SKIN BID FOR 5 DAYS / ONLY USE IF NYSTATIN NOT IMPROVING.  0    esomeprazole Magnesium (NEXIUM) 20 MG PACK Take 20 mg by mouth daily      Calcium Citrate-Vitamin D (CITRACAL + D PO) Take 1 capsule by mouth daily      rOPINIRole (REQUIP) 0.5 MG tablet Take 0.5 mg by mouth 2 times daily 1 tab in am and 2 tabs in pm      losartan (COZAAR) 100 MG tablet Take 100 mg by mouth daily.  amLODIPine (NORVASC) 5 MG tablet Take 5 mg by mouth daily. No current facility-administered medications for this visit. Objective:     She  is alert, oriented x 3, pleasant, well nourished, developed and in no acute distress. Temp 97 °F (36.1 °C) (Temporal)      Examination of the Right Foot:  There is moderate soft tissue swelling. There is no obvious deformity. There is marked bony tenderness to palpation over the fourth and fifth metatarsals. There is no crepitus with palpation. ROM is limited by pain and/ or swelling. HIP EXAM:  Examination of the left and right hip shows: There is not deformity.   There is not erythema. There is no pain with internal and external rotation. There is no pain with flexion and extension. There is no pain with active SLR. ROM diminished range of motion. Trochanteric region is not tender to palpation. Sacral Iliac is not tender to palpation. There is no pain with weight bearing. Examination of the left and right knee: The alignment of the knee is neutral.   There is not erythema. There no soft tissue swelling. There is no effusion. ROM-  Extension 0          -   Flexion  125   There no pain associated with ROM testing. There is no crepitus, instability with range of motion testing. Extensor Mechanism is  intact. NEUROLOGICAL EXAM:  Examination of the lower extremities are intact with sensation to light touch to the foot and digits. Able to move all digits. VASCULAR EXAM:  Examination of the lower extremities shows intact perfusion to both extremities. No cyanosis, digits are warm to touch, capillary refill is less than 2 seconds. SKIN:  Examination of the skin reveals the skin to be intact without lacerations, abrasions, significant erythema, rashes or skin lesions. X Rays: performed in the office today:   AP, Lateral and Oblique of the Right Foot:  Radiographs demonstrate normal bony alignment of the foot. No acute displaced fractures or dislocations. AP Standing, Lateral and Sunrise  Left Knee: There is a left prosthetic total knee arthroplasty present. The alignment is satisfactory. There are no signs of failure or loosening. AP Standing, Lateral and Sunrise  Right Knee: There is a right prosthetic total knee arthroplasty present. The alignment is satisfactory. There are no signs of failure or loosening. AP Pelvis, AP and Frog Leg Lateral  Left and Right Hip: There is a bilateral prosthetic total hip arthroplasty present. The alignment is satisfactory. There are no signs of failure, dislocation or loosening. Assessment:       ICD-10-CM    1. History of total hip arthroplasty, right-1.31.2014  Z96.641 XR HIP 3-4 VW W PELVIS BILATERAL   2. History of total hip arthroplasty, left-12.2014  Z96.642 XR HIP 3-4 VW W PELVIS BILATERAL   3. History of total knee arthroplasty, right-12.8.2017  Z96.651 XR Knee Bilateral Standing     XR KNEE RIGHT (1-2 VIEWS)   4. History of total knee arthroplasty, left-2007  Z96.652 XR Knee Bilateral Standing     XR KNEE LEFT (1-2 VIEWS)   5. Right foot pain  M79.671 XR FOOT RIGHT (MIN 3 VIEWS)     Darco Post-op Shoe Brace        Plan:     I did spend 30 minutes in the office today with greater than 50% of this time counseling, reviewing diagnostic tests, face to face discussion concerning their diagnosis and treatment options. All of their questions were answered. Clinically and radiographically stable bilateral knee as well as bilateral hip arthroplasties. Right foot pain with moderate swelling and pain over the fourth and fifth metatarsal may represent stress reaction or stress fracture at the metatarsal.  X-rays negative for acute displaced fracture. The natural history of the patient's diagnosis as well as the treatment options were discussed in full and questions were answered. Risks and benefits of the treatment options also reviewed in detail. The risk of delayed union, non union, mal union, the need for future surgical repair, continued pain were discussed with the patient in detail today. She  understands some underlying medical conditions may also have an effect on bone healing as dose smoking. She was instructed in proper nutrition, the need to stop smoking, proper immobilization and the importance to adhere to my recommendations. Splinting was recommended today. Procedures    Darco Post-op Shoe Brace     Patient was prescribed a Darco Post-Op Shoe.   The right foot will require stabilization / immobilization from this semi-rigid / rigid

## 2020-08-20 ENCOUNTER — OFFICE VISIT (OUTPATIENT)
Dept: ORTHOPEDIC SURGERY | Age: 75
End: 2020-08-20
Payer: MEDICARE

## 2020-08-20 VITALS — TEMPERATURE: 97.7 F

## 2020-08-20 PROBLEM — M84.376D METATARSAL STRESS FRACTURE WITH ROUTINE HEALING: Status: ACTIVE | Noted: 2020-08-20

## 2020-08-20 PROCEDURE — 4040F PNEUMOC VAC/ADMIN/RCVD: CPT | Performed by: PHYSICIAN ASSISTANT

## 2020-08-20 PROCEDURE — 1036F TOBACCO NON-USER: CPT | Performed by: PHYSICIAN ASSISTANT

## 2020-08-20 PROCEDURE — G8428 CUR MEDS NOT DOCUMENT: HCPCS | Performed by: PHYSICIAN ASSISTANT

## 2020-08-20 PROCEDURE — G8421 BMI NOT CALCULATED: HCPCS | Performed by: PHYSICIAN ASSISTANT

## 2020-08-20 PROCEDURE — G8400 PT W/DXA NO RESULTS DOC: HCPCS | Performed by: PHYSICIAN ASSISTANT

## 2020-08-20 PROCEDURE — 99213 OFFICE O/P EST LOW 20 MIN: CPT | Performed by: PHYSICIAN ASSISTANT

## 2020-08-20 PROCEDURE — 1123F ACP DISCUSS/DSCN MKR DOCD: CPT | Performed by: PHYSICIAN ASSISTANT

## 2020-08-20 PROCEDURE — 1090F PRES/ABSN URINE INCON ASSESS: CPT | Performed by: PHYSICIAN ASSISTANT

## 2020-08-20 PROCEDURE — 3017F COLORECTAL CA SCREEN DOC REV: CPT | Performed by: PHYSICIAN ASSISTANT

## 2020-08-20 NOTE — PROGRESS NOTES
Subjective:      Patient ID: Hilda Diggs is a 70 y.o.  female. Chief Complaint   Patient presents with    Foot Pain     Right foot        HPI: She is here for follow-up on her right foot, suspected stress fracture of the fifth metatarsal.  Symptoms began 7/6/2020 without injury. She has been treated in a postop shoe. Pain is better than it was when she first presented. Pain currently 4-5/10. Review of Systems:   Negative for fever or chills. Negative for numbness or tingling right foot.     Past Medical History:   Diagnosis Date    Achalasia     Anemia     Arthritis     Breast cancer ([facility] [address] 2011    left breast mastectomy    Clostridium difficile infection 2/25/14    Diabetes mellitus ([facility] [address]     diet controlled    HBP (high blood pressure)     Hearing impairment     Hyperlipidemia     Nausea & vomiting     PONV (postoperative nausea and vomiting)     Sinus disorder     Tremor of both hands     Wears glasses     Wears hearing aid     bilat    Weight disorder        Family History   Problem Relation Age of Onset    Arthritis Other     Cancer Other     Heart Disease Other     High Blood Pressure Other     Stroke Other        Past Surgical History:   Procedure Laterality Date    BREAST LUMPECTOMY  2011    BREAST SURGERY  left masectomy    ESOPHAGUS SURGERY      HAND SURGERY Right     HYSTERECTOMY      JOINT REPLACEMENT  left knee replacement 2007    JOINT REPLACEMENT Right 1/31/14    Right total hip replacement    JOINT REPLACEMENT Left 12/2014    hip     JOINT REPLACEMENT Right     SHOULDER SURGERY      TONSILLECTOMY         Social History     Occupational History    Not on file   Tobacco Use    Smoking status: Never Smoker    Smokeless tobacco: Never Used   Substance and Sexual Activity    Alcohol use: No     Alcohol/week: 0.0 standard drinks    Drug use: No    Sexual activity: Not on file       Current Outpatient Medications   Medication Sig Dispense Refill    cephALEXin (KEFLEX) 500 MG capsule 2 po 2 hours pre procedure and 2 po 2 hours post procedure 4 capsule 3    pravastatin (PRAVACHOL) 20 MG tablet Take 1 tablet by mouth nightly 30 tablet 0    HYDROcodone-acetaminophen (NORCO) 5-325 MG per tablet Take 1 tablet by mouth every 6 hours as needed for Pain . 60 tablet 0    metFORMIN (GLUCOPHAGE-XR) 500 MG extended release tablet Take 1,000 mg by mouth      triamcinolone (KENALOG) 0.1 % ointment APPLY TO SKIN BID FOR 5 DAYS / ONLY USE IF NYSTATIN NOT IMPROVING.  0    esomeprazole Magnesium (NEXIUM) 20 MG PACK Take 20 mg by mouth daily      Calcium Citrate-Vitamin D (CITRACAL + D PO) Take 1 capsule by mouth daily      rOPINIRole (REQUIP) 0.5 MG tablet Take 0.5 mg by mouth 2 times daily 1 tab in am and 2 tabs in pm      losartan (COZAAR) 100 MG tablet Take 100 mg by mouth daily.  amLODIPine (NORVASC) 5 MG tablet Take 5 mg by mouth daily. No current facility-administered medications for this visit. Objective:   She is alert, oriented x 3, pleasant, well nourished, developed and in no acute distress. Temp 97.7 °F (36.5 °C) (Temporal)      Examination of the Right Foot:  There is moderate soft tissue swelling. There is no obvious deformity. There is marked bony tenderness to palpation over the fourth and fifth metatarsals. There is no crepitus with palpation. ROM is limited by pain and/ or swelling. Examination of the lower extremities are intact with sensation to light touch. Motor testing  5/5 in all major motor groups of the lower extremities. Gait is normal heel to toe. Gait is antalgic. Negative Ryan's Sign. SLR negative. Examination of the lower extremities shows intact perfusion to all extremities. No cyanosis. Digits are warm to touch, capillary refill is less than 2 seconds. There is mild edema noted RLE. Examination of the skin over both lower extremities reveals:   The skin to be intact without lacerations or abrasions. No significant erythema. No rashes or skin lesions. X Rays: performed in the office today:   AP, lateral and oblique x-ray right foot demonstrates a healing stress fracture of the right distal one third fifth metatarsal shaft. Diagnosis:        ICD-10-CM    1. Right foot pain  M79.671 XR FOOT RIGHT (MIN 3 VIEWS)   2. Stress fracture of metatarsal bone of right foot with routine healing, subsequent encounter  M84.374D         Assessment/ Plan:      I did spend 15 minutes in the office today with greater than 50% of this time counseling, reviewing diagnostic tests, face to face discussion concerning their diagnosis and treatment options. All of their questions were answered. She has a healing stress fracture right fifth metatarsal.    The natural history of the patient's diagnosis as well as the treatment options were discussed in full and questions were answered. Risks and benefits of the treatment options also reviewed in detail. Continue postop shoe for immobilization. She may begin to wean out into regular shoe as pain allows. Follow Up: 4 weeks  Call or return to clinic prn if these symptoms worsen or fail to improve as anticipated.

## 2020-10-22 ENCOUNTER — TELEPHONE (OUTPATIENT)
Dept: ORTHOPEDIC SURGERY | Age: 75
End: 2020-10-22

## 2020-10-22 RX ORDER — CEPHALEXIN 500 MG/1
500 CAPSULE ORAL SEE ADMIN INSTRUCTIONS
Qty: 4 CAPSULE | Refills: 0 | Status: SHIPPED | OUTPATIENT
Start: 2020-10-22

## 2020-10-22 RX ORDER — CEPHALEXIN 500 MG/1
CAPSULE ORAL
Qty: 4 CAPSULE | Refills: 3 | Status: CANCELLED | OUTPATIENT
Start: 2020-10-22

## 2020-11-05 ENCOUNTER — HOSPITAL ENCOUNTER (OUTPATIENT)
Dept: WOMENS IMAGING | Age: 75
Discharge: HOME | End: 2020-11-05
Payer: MEDICARE

## 2020-11-05 PROCEDURE — 77067 SCR MAMMO BI INCL CAD: CPT

## 2021-03-23 RX ORDER — AMOXICILLIN 500 MG/1
2000 CAPSULE ORAL ONCE
Qty: 4 CAPSULE | Refills: 0 | Status: SHIPPED | OUTPATIENT
Start: 2021-03-23 | End: 2021-03-23

## 2021-07-06 ENCOUNTER — OFFICE VISIT (OUTPATIENT)
Dept: ORTHOPEDIC SURGERY | Age: 76
End: 2021-07-06
Payer: MEDICARE

## 2021-07-06 VITALS — WEIGHT: 196 LBS | HEIGHT: 62 IN | BODY MASS INDEX: 36.07 KG/M2

## 2021-07-06 DIAGNOSIS — M18.12 ARTHRITIS OF CARPOMETACARPAL (CMC) JOINT OF LEFT THUMB: ICD-10-CM

## 2021-07-06 DIAGNOSIS — M79.642 LEFT HAND PAIN: ICD-10-CM

## 2021-07-06 DIAGNOSIS — M25.511 ACUTE PAIN OF RIGHT SHOULDER: Primary | ICD-10-CM

## 2021-07-06 DIAGNOSIS — S46.011A STRAIN OF RIGHT ROTATOR CUFF CAPSULE, INITIAL ENCOUNTER: ICD-10-CM

## 2021-07-06 DIAGNOSIS — S63.512A: ICD-10-CM

## 2021-07-06 PROCEDURE — L3908 WHO COCK-UP NONMOLDE PRE OTS: HCPCS | Performed by: PHYSICIAN ASSISTANT

## 2021-07-06 PROCEDURE — 4040F PNEUMOC VAC/ADMIN/RCVD: CPT | Performed by: PHYSICIAN ASSISTANT

## 2021-07-06 PROCEDURE — 1090F PRES/ABSN URINE INCON ASSESS: CPT | Performed by: PHYSICIAN ASSISTANT

## 2021-07-06 PROCEDURE — G8400 PT W/DXA NO RESULTS DOC: HCPCS | Performed by: PHYSICIAN ASSISTANT

## 2021-07-06 PROCEDURE — 1123F ACP DISCUSS/DSCN MKR DOCD: CPT | Performed by: PHYSICIAN ASSISTANT

## 2021-07-06 PROCEDURE — 1036F TOBACCO NON-USER: CPT | Performed by: PHYSICIAN ASSISTANT

## 2021-07-06 PROCEDURE — G8427 DOCREV CUR MEDS BY ELIG CLIN: HCPCS | Performed by: PHYSICIAN ASSISTANT

## 2021-07-06 PROCEDURE — 99214 OFFICE O/P EST MOD 30 MIN: CPT | Performed by: PHYSICIAN ASSISTANT

## 2021-07-06 PROCEDURE — G8417 CALC BMI ABV UP PARAM F/U: HCPCS | Performed by: PHYSICIAN ASSISTANT

## 2021-07-06 PROCEDURE — 20610 DRAIN/INJ JOINT/BURSA W/O US: CPT | Performed by: PHYSICIAN ASSISTANT

## 2021-07-06 PROCEDURE — 3017F COLORECTAL CA SCREEN DOC REV: CPT | Performed by: PHYSICIAN ASSISTANT

## 2021-07-07 PROBLEM — M25.511 ACUTE PAIN OF RIGHT SHOULDER: Status: ACTIVE | Noted: 2021-07-07

## 2021-07-07 PROBLEM — M79.642 LEFT HAND PAIN: Status: ACTIVE | Noted: 2021-07-07

## 2021-07-07 NOTE — PROGRESS NOTES
the right shoulder: There is no deformity. There is no erythema. There is mild  soft tissue swelling and ecchymosis over the deltoid region. Deltoid region is  tender to palpation. AC Joint is  tender to palpation. Clavicle is not tender to palpation. Bicipital Groove is not  tender to palpation. Pectoralis  is not tender to palpation. Scapula/ trapezius is not tender to palpation. There is moderate weakness with supraspinatus testing. There is moderate pain with supraspinatus testing. Shoulder Active ROM -       IR to ASIS ER 80      Left hand and wrist exam:   There is moderate swelling. There is not skeletal deformity. Wrist range of motion is mot limited by pain. Left thumb range of motion is limited by pain and swelling. Positive pinch test and positive grind test.  No pain with palpation over the distal radius or anatomic snuffbox. FDS,FDP and Common Extensor tendon function is intact to each digit. FPL and EPL tendon function is intact to the thumb. Skin color, texture, turgor is normal.  No rashes or lesions found of the injured extremity. Vascular exam shows normal  And good capillary refill bilaterally. Sensation is subjectively normal in the whole hand. Digits are normally sensate. X Rays: performed in the office today:   AP, Y and Axillary views of the right shoulder. No acute fractures or dislocations. There is mild narrowing at the subacromial space. Mild narrowing of the glenohumeral joint. Mild arthritic changes of the AC joint. AP, Lateral and Oblique of the Left Hand:  Degenerative arthritic changes of the base of the thumb CMC joint. There is no radiographic evidence of a fracture or dislocation. Additional Tests reviewed: none  Additional Outside Records reviewed: none    Diagnosis:       ICD-10-CM    1. Acute pain of right shoulder  M25.511 XR SHOULDER RIGHT (MIN 2 VIEWS)   2. Left hand pain  M79.642 XR HAND LEFT (MIN 3 VIEWS)   3. Arthritis of carpometacarpal [place]) joint of left thumb  M18.12 Debbie Faustin [employer] Wrist and Thumb Brace   4. Sprain of carpal (joint) of wrist, left, initial encounter  S63.512A Debbie Faustin [employer] Wrist [facility] Thumb Brace   5. Strain of right rotator cuff capsule, initial encounter  S46.011A MT ARTHROCENTESIS ASPIR&/INJ MAJOR JT/BURSA W/O US     MT TRIAMCINOLONE ACETONIDE INJ        Assessment and Plan:       Assessment:  Acute right shoulder injury. Rotator cuff sprain possible rotator cuff tear. Acute left hand injury, sprain with 1st CMC joint arthritis. Plan:  Medications- OTC NSAIDS discussed. She  was advised that NSAID-type medications have two very important potential side effects: gastrointestinal irritation including hemorrhage and renal injuries. She was asked to take the medication with food and to stop if she experiences any GI upset. I asked her to call for vomiting, abdominal pain or black/bloody stools. She should have renal function testing per his medical provider periodically. The patient expresses understanding of these issues and questions were answered. PT- A home exercise program was instructed today including ROM exercises and strengthening exercises. The patient verbalized understanding of these exercises as well as the importance of the exercise program to promote return of normal function. If pain intensifies or other problems arise you are to notify the office. Recommend right shoulder subacromial injection today. Recommend immobilization of the left thumb CMC joint for sprain and arthritis. Discussed surgical options for left hand CMC joint arthritis. Discussed possible option of left CMC joint injection in the future. Further Imaging-discussed possible MRI of the shoulder if symptoms fail to improve with injection.     Procedures-   Cortisone  Injection  PROCEDURE NOTE:  Pre op Diagnosis: Shoulder pain  Post op Diagnosis: Same  With Ange Plasencia permission, her

## 2021-08-05 ENCOUNTER — OFFICE VISIT (OUTPATIENT)
Dept: ORTHOPEDIC SURGERY | Age: 76
End: 2021-08-05
Payer: MEDICARE

## 2021-08-05 VITALS
SYSTOLIC BLOOD PRESSURE: 135 MMHG | DIASTOLIC BLOOD PRESSURE: 67 MMHG | BODY MASS INDEX: 34.23 KG/M2 | HEART RATE: 89 BPM | HEIGHT: 62 IN | WEIGHT: 186 LBS

## 2021-08-05 DIAGNOSIS — M18.12 ARTHRITIS OF CARPOMETACARPAL (CMC) JOINT OF LEFT THUMB: ICD-10-CM

## 2021-08-05 DIAGNOSIS — S16.1XXA STRAIN OF NECK MUSCLE, INITIAL ENCOUNTER: ICD-10-CM

## 2021-08-05 DIAGNOSIS — S43.421A SPRAIN OF RIGHT ROTATOR CUFF CAPSULE, INITIAL ENCOUNTER: Primary | ICD-10-CM

## 2021-08-05 PROCEDURE — 1123F ACP DISCUSS/DSCN MKR DOCD: CPT | Performed by: PHYSICIAN ASSISTANT

## 2021-08-05 PROCEDURE — G8417 CALC BMI ABV UP PARAM F/U: HCPCS | Performed by: PHYSICIAN ASSISTANT

## 2021-08-05 PROCEDURE — 1036F TOBACCO NON-USER: CPT | Performed by: PHYSICIAN ASSISTANT

## 2021-08-05 PROCEDURE — 1090F PRES/ABSN URINE INCON ASSESS: CPT | Performed by: PHYSICIAN ASSISTANT

## 2021-08-05 PROCEDURE — G8427 DOCREV CUR MEDS BY ELIG CLIN: HCPCS | Performed by: PHYSICIAN ASSISTANT

## 2021-08-05 PROCEDURE — 99213 OFFICE O/P EST LOW 20 MIN: CPT | Performed by: PHYSICIAN ASSISTANT

## 2021-08-05 PROCEDURE — G8400 PT W/DXA NO RESULTS DOC: HCPCS | Performed by: PHYSICIAN ASSISTANT

## 2021-08-05 PROCEDURE — 4040F PNEUMOC VAC/ADMIN/RCVD: CPT | Performed by: PHYSICIAN ASSISTANT

## 2021-08-05 PROCEDURE — 3017F COLORECTAL CA SCREEN DOC REV: CPT | Performed by: PHYSICIAN ASSISTANT

## 2021-08-07 PROBLEM — S43.421A SPRAIN OF RIGHT ROTATOR CUFF CAPSULE: Status: ACTIVE | Noted: 2021-08-07

## 2021-08-07 PROBLEM — S16.1XXA CERVICAL STRAIN: Status: ACTIVE | Noted: 2021-08-07

## 2021-08-07 NOTE — PROGRESS NOTES
Subjective:      Patient ID: Rene Reyes is a 70 y.o. female who is here for follow up evaluation of left thumb injury, right shoulder injury after falling on 6/29/2021. She did undergo a right shoulder subacromial injection on 7/7/2021 with mild improvement. She still having some pain in the lateral neck and trapezius region. She denies any significant numbness or tingling in the upper extremities. She is utilizing the splint on her left thumb CMC sprain and arthritis. Review Of Systems:   Negative for fever or chills.       Past Medical History:   Diagnosis Date    Achalasia     Anemia     Arthritis     Breast cancer ([facility] [address] 2011    left breast mastectomy    Clostridium difficile infection 2/25/14    Diabetes mellitus ([facility] [address]     diet controlled    HBP (high blood pressure)     Hearing impairment     Hyperlipidemia     Nausea & vomiting     PONV (postoperative nausea and vomiting)     Sinus disorder     Tremor of both hands     Wears glasses     Wears hearing aid     bilat    Weight disorder        Family History   Problem Relation Age of Onset    Arthritis Other     Cancer Other     Heart Disease Other     High Blood Pressure Other     Stroke Other        Past Surgical History:   Procedure Laterality Date    BREAST LUMPECTOMY  2011    BREAST SURGERY  left masectomy    ESOPHAGUS SURGERY      HAND SURGERY Right     HYSTERECTOMY      JOINT REPLACEMENT  left knee replacement 2007    JOINT REPLACEMENT Right 1/31/14    Right total hip replacement    JOINT REPLACEMENT Left 12/2014    hip     JOINT REPLACEMENT Right     SHOULDER SURGERY      TONSILLECTOMY         Social History     Occupational History    Not on file   Tobacco Use    Smoking status: Never Smoker    Smokeless tobacco: Never Used   Vaping Use    Vaping Use: Never used   Substance and Sexual Activity    Alcohol use: No     Alcohol/week: 0.0 standard drinks    Drug use: No    Sexual activity: Not on file Current Outpatient Medications   Medication Sig Dispense Refill    cephALEXin (KEFLEX) 500 MG capsule Take 1 capsule by mouth See Admin Instructions Take 2 tablets one hour before and 2 tablets one hour after dental procedure 4 capsule 0    pravastatin (PRAVACHOL) 20 MG tablet Take 1 tablet by mouth nightly 30 tablet 0    HYDROcodone-acetaminophen (NORCO) 5-325 MG per tablet Take 1 tablet by mouth every 6 hours as needed for Pain . 60 tablet 0    metFORMIN (GLUCOPHAGE-XR) 500 MG extended release tablet Take 1,000 mg by mouth      triamcinolone (KENALOG) 0.1 % ointment APPLY TO SKIN BID FOR 5 DAYS / ONLY USE IF NYSTATIN NOT IMPROVING.  0    esomeprazole Magnesium (NEXIUM) 20 MG PACK Take 20 mg by mouth daily      Calcium Citrate-Vitamin D (CITRACAL + D PO) Take 1 capsule by mouth daily      rOPINIRole (REQUIP) 0.5 MG tablet Take 0.5 mg by mouth 2 times daily 1 tab in am and 2 tabs in pm      losartan (COZAAR) 100 MG tablet Take 100 mg by mouth daily. No current facility-administered medications for this visit. Objective:     She is alert, oriented x 3, pleasant, well nourished, developed and in no   acute distress. /67   Pulse 89   Ht 5' 2\" (1.575 m)   Wt 186 lb (84.4 kg)   BMI 34.02 kg/m²        Examination of the left shoulder: There is no deformity. There is no erythema. There is no  soft tissue swelling. Deltoid region is  tender to palpation. AC Joint is not tender to palpation. Clavicle is not tender to palpation. Bicipital Groove is not  tender to palpation. Pectoralis  is not tender to palpation. Scapula/ trapezius is  tender to palpation. There is mild weakness with supraspinatus testing. There is mild pain with supraspinatus testing. Yergason Test negative. Drop Arm Test negative. Apprehension Test negative. Cross Arm Test negative.   Shoulder Active ROM -       IR to ASIS ER 80    Examination left hand:  Positive pinch test and grind test.        X Rays: not performed in the office today:       Diagnosis:       ICD-10-CM    1. Sprain of right rotator cuff capsule, initial encounter  G75.546P Ambulatory referral to Physical Therapy   2. Strain of neck muscle, initial encounter  S16. 1XXA Ambulatory referral to Physical Therapy   3. Arthritis of carpometacarpal (CMC) joint of left thumb  M18.12         Assessment and Plan:       Assessment:  Probable partial rotator cuff tear with moderate glenohumeral arthritis. Arthritis of the thumb CMC joint with a mild sprain of the CMC joint. I had an extensive discussion with Ms. Gibson Thomas regarding the natural history, etiology, and long term consequences of her condition. I have presented reasonable alternatives to the patient's proposed care, treatment, and services. Risks and benefits of the treatment options also reviewed in detail. I have outlined a treatment plan with them. She has had full opportunity to ask her questions. I have answered them all to her satisfaction. I feel that Ms. Gibson Thomas understands our discussion today       Plan:  Medications-Tylenol for pain. Discussed topical anti-inflammatory such as diclofenac. PT-PT for right shoulder recommended. Further Imaging-x-rays of her arthroplasties bilateral hips and knees next office visit. Follow up- 6 weeks. Call or return to clinic if these symptoms worsen or fail to improve as anticipated. Jannette Quinteros PA-C   Senior Physician Assistant   [facility]                                         Disclaimer: This note was generated with use of a verbal recognition program (DRAGON) and an attempt was made to check for errors. It is possible that there are still dictated errors within this office note. If so, please bring any significant errors to my attention for an addendum.   All efforts were made to ensure that this office note is accurate.

## 2021-08-18 ENCOUNTER — HOSPITAL ENCOUNTER (OUTPATIENT)
Dept: PHYSICAL THERAPY | Age: 76
Setting detail: THERAPIES SERIES
Discharge: HOME | End: 2021-08-18
Payer: MEDICARE

## 2021-08-18 PROCEDURE — 97140 MANUAL THERAPY 1/> REGIONS: CPT

## 2021-08-18 PROCEDURE — 97110 THERAPEUTIC EXERCISES: CPT

## 2021-08-18 PROCEDURE — 97161 PT EVAL LOW COMPLEX 20 MIN: CPT

## 2021-08-18 NOTE — PROGRESS NOTES
[facility], [place] [address]. [place] [address]  Phone: [phone]   Fax:     [phone]                                                       Physical Therapy Certification    Dear Referring Practitioner: UNA Pino,    We had the pleasure of evaluating the following patient for physical therapy services at [facility]. A summary of our findings can be found in the initial assessment below. This includes our plan of care. If you have any questions or concerns regarding these findings, please do not hesitate to contact me at the office phone number checked above. Thank you for the referral.       Physician Signature:_______________________________Date:__________________  By signing above (or electronic signature), therapists plan is approved by physician        Patient: Carmen Lake   :    MRN: [id number]  Referring Physician: Referring Practitioner: UNA Pino      Evaluation Date: 2021      Medical Diagnosis Information:  Diagnosis: Sprain of right rotator cuff capsule, initial encounter (P73.196W); Strain of neck muscle, initial encounter (S16.1XXA)   Treatment Diagnosis: Decreased ADL status                                         Insurance information: PT Insurance Information: Medicare/[facility]    Precautions/ Contra-indications:   Latex Allergy:   [x]  NO      []YES  Preferred Language for Healthcare:   [x]English       []other:    C-SSRS Triggered by Intake questionnaire (Past 2 wk assessment ):   [x] No, Questionnaire did not trigger screening.   [] Yes, Patient intake triggered C-SSRS Screening      [] C-SSRS Screening completed  [] PCP notified via Epic     SUBJECTIVE: Patient had fall on 21 and injured her R shoulder and neck as she \"fell face forward\" with B arms outstretch.   Pt had subacromial injection on 21 Patient history, allergies, meds reviewed. Medical chart reviewed. See intake form. Review Of Systems (ROS):  [x]Performed Review of systems (Integumentary, CardioPulmonary, Neurological) by intake and observation. Intake form has been scanned into medical record. Patient has been instructed to contact their primary care physician regarding ROS issues if not already being addressed at this time. Co-morbidities/Complexities (which will affect course of rehabilitation):   []None           Arthritic conditions   []Rheumatoid arthritis (M05.9)  [x]Osteoarthritis (M19.91)   Cardiovascular conditions   []Hypertension (I10)  [x]Hyperlipidemia (E78.5)  []Angina pectoris (I20)  []Atherosclerosis (I70)  []CVA Musculoskeletal conditions   []Disc pathology   []Congenital spine pathologies   []Prior surgical intervention  []Osteoporosis (M81.8)  [x]Osteopenia (M85.8)   Endocrine conditions   []Hypothyroid (E03.9)  []Hyperthyroid Gastrointestinal conditions   []Constipation (C49.89)   Metabolic conditions   []Morbid obesity (E66.01)  [x]Diabetes type 1(E10.65) or 2 (E11.65)   []Neuropathy (G60.9)     Pulmonary conditions   []Asthma (J45)  []Coughing   []COPD (J44.9)   Psychological Disorders  []Anxiety (F41.9)  []Depression (F32.9)   []Other:   [x]Other:     CA  Anemia  GRACIA R 2014  TKA L 2014  Hand surgery  Shoulder surgery  Parkinsons     Barriers to/and or personal factors that will affect rehab potential:              []Age  []Sex   []Smoker              []Motivation/Lack of Motivation                        []Co-Morbidities              []Cognitive Function, education/learning barriers              []Environmental, home barriers              []profession/work barriers  []past PT/medical experience  []other:  Justification:     Falls Risk Assessment (30 days):   [x] Falls Risk assessed and no intervention required.   [] Falls Risk assessed and Patient requires intervention due to being higher risk   TUG score (>12s at risk):     [] Falls education provided, including         ASSESSMENT:  Functional Impairments:     [x]Noted spinal or UE joint hypomobility   []Noted spinal or UE joint hypermobility   [x]Decreased spinal/UE functional ROM   []Abnormal reflexes/sensation/myotomal/dermatomal deficits   [x]Decreased RC/scapular/core strength and neuromuscular control    [x]Decreased UE functional strength   []other:      Functional Activity Limitations (from functional questionnaire and intake)   [x]Reduced ability to tolerate prolonged functional positions   [x]Reduced ability or difficulty with changes of positions or transfers between positions   []Reduced ability to maintain good posture and demonstrate good body mechanics with sitting, bending, and lifting   [] Reduced ability or tolerance with driving and/or computer work   [x]Reduced ability to perform lifting, reaching, carrying tasks   [x]Reduced ability to reach behind back   [x]Reduced ability to sleep    [x]Reduced ability to tolerate any impact through UE or spine   [x]Reduced ability to  or hold objects   [x]Reduced ability to throw or toss an object   []other:    Participation Restrictions   [x]Reduced participation in self care activities   [x]Reduced participation in home management activities   []Reduced participation in work activities   [x]Reduced participation in social activities. []Reduced participation in sport/recreational activities. Classification/Subgrouping:   []signs/symptoms consistent with post-surgical status including decreased ROM, strength and function.     [x]signs/symptoms consistent with joint sprain/strain    []signs/symptoms consistent with shoulder impingement (internal, external, primary or secondary)   []signs/symptoms consistent with shoulder/elbow/wrist tendinopathy   []Signs/symptoms consistent with Rotator cuff tear   []sign/symptoms consistent with labral tear   []signs/symptoms consistent with rib dysfunction   []signs/symptoms consistent with postural dysfunction   []signs/symptoms consistent with Glenohumeral IR Deficit - <45 degrees   []signs/symptoms consistent with facet dysfunction of cervical/thoracic spine   []signs/symptoms consistent with pathology which may benefit from Dry Needling   []signs/symptoms which may limit the use of advanced manual therapy techniques: (Elevated CV risk profile, recent trauma, intolerance to end range positions, prior TIA, visual issues, UE neurological compromise )     Prognosis/Rehab Potential:      []Excellent   [x]Good    []Fair   []Poor    Tolerance of evaluation/treatment:    []Excellent   [x]Good    []Fair   []Poor    Physical Therapy Evaluation Complexity Justification  [x] A history of present problem with:  [] no personal factors and/or comorbidities that impact the plan of care;  []1-2 personal factors and/or comorbidities that impact the plan of care  [x]3 personal factors and/or comorbidities that impact the plan of care  [x] An examination of body systems using standardized tests and measures addressing any of the following: body structures and functions (impairments), activity limitations, and/or participation restrictions;:  [] a total of 1-2 or more elements   [] a total of 3 or more elements   [x] a total of 4 or more elements   [x] A clinical presentation with:  [x] stable and/or uncomplicated characteristics   [] evolving clinical presentation with changing characteristics  [] unstable and unpredictable characteristics;   [x] Clinical decision making of [] low, [] moderate, [] high complexity using standardized patient assessment instrument and/or measurable assessment of functional outcome.     [x] EVAL (LOW) 72743 (typically 20 minutes face-to-face)  [] EVAL (MOD) 39736 (typically 30 minutes face-to-face)  [] EVAL (HIGH) 15247 (typically 45 minutes face-to-face)  [] RE-EVAL     PLAN:   Frequency/Duration:  1-2 days per week for 6 Weeks:  Interventions:  [x]  Therapeutic exercise including: strength training, ROM, for scapula, core and Upper extremity, including postural re-education. [x]  NMR activation and proprioception for UE, periscapular and RC muscles and Core, including postural re-education. [x]  Manual therapy as indicated for shoulder, scapula, spine and associated soft tissue including: Dry Needling/IASTM, STM, PROM, Gr I-IV mobilizations, manipulation. [x] Modalities as needed that may include: thermal agents, E-stim, Biofeedback, US, iontophoresis as indicated  [x] Patient education on joint protection, postural re-education, activity modification, progression of HEP. HEP instruction:   Access Code: Augusta Castro  URL: [email or web address]  Date: 08/18/2021  Prepared by: Ginny Mason    Exercises  Standing Shoulder Shrugs - 1 x daily - 7 x weekly - 10 reps - 5 hold  Standing Scapular Retraction - 1 x daily - 7 x weekly - 10 reps - 5 hold  Standing Shoulder Flexion AAROM with Dowel - 1 x daily - 7 x weekly - 10 reps  Seated Cervical Rotation AROM - 1 x daily - 7 x weekly - 10 reps  Standing Shoulder External and Internal Rotation AROM - 1 x daily - 7 x weekly - 10 reps      GOALS:  Patient stated goal: \"being able to do stuff again\"  [] Progressing: [] Met: [] Not Met: [] Adjusted    Therapist goals for Patient:   Short Term Goals: To be achieved in: 2 weeks  1. Independent in HEP and progression per patient tolerance, in order to prevent re-injury. [] Progressing: [] Met: [] Not Met: [] Adjusted  2. Patient will have a decrease in pain to facilitate improvement in movement, function, and ADLs as indicated by Functional Deficits. [] Progressing: [] Met: [] Not Met: [] Adjusted    Long Term Goals: To be achieved in: 8 weeks  1. Disability index score of 20% or less for the Quick DASH to assist with reaching prior level of function. [] Progressing: [] Met: [] Not Met: [] Adjusted  2.  Patient will demonstrate increased AROM R shoulder ABD to 130 degrees allow for proper joint functioning as indicated by Functional Deficits. [] Progressing: [] Met: [] Not Met: [] Adjusted  3. Patient will demonstrate an increase in NM recruitment/activation and overall GH and scapular strength to within n5lbs HHD or WNL for proper functional mobility as indicated by patients Functional Deficits. [] Progressing: [] Met: [] Not Met: [] Adjusted  4. 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 [] Progressing: [] Met: [] Not Met: [] Adjusted  5. Patient will be able to clean her house. [] Progressing: [] Met: [] Not Met: [] Adjusted    Electronically signed by:  Jorge Vaughn PT      Note: If patient does not return for scheduled/recommended follow up visits, this note will serve as a discharge from care along with the most recent update on progress.

## 2021-08-18 NOTE — FLOWSHEET NOTE
[facility] [place] [address]. [place] [address]  Phone: [phone]   Fax:     [phone]        Physical Therapy Treatment Note/ Progress Report:       Date:  2021    Patient Name:  Toribio Rowley    :  [birth year]  MRN: [id number]    Pertinent Medical History:     Medical/Treatment Diagnosis Information:  · Diagnosis: Sprain of right rotator cuff capsule, initial encounter (E58.112I); Strain of neck muscle, initial encounter (S16.1XXA)  · Treatment Diagnosis: Decreased ADL status    Insurance/Certification information:  PT Insurance Information: [facility]  Physician Information:  Referring Practitioner: UNA Cage  Plan of care signed (Y/N): N    Date of Patient follow up with Physician:      Progress Report: []  Yes  [x]  No     Date Range for reporting period:  Beginnin21  Ending:      Progress report due (10 Rx/or 30 days whichever is less):      Recertification due (POC duration/ or 90 days whichever is less):      Visit # POC/Insurance Allowable Auth Needed   1 16 []Yes    []No     Pain level:  5/10 R neck and shoulder     History of Injury:Patient had fall on 21 and injured her R shoulder and neck as she \"fell face forward\" with B arms outstretch. Pt had subacromial injection on 21 which \"helped a little bit. \"  Currently pain is located at the R lateral neck, R upper shoulder, R anterior chest, R scapula. Pt stated \"I don't have any strength\" and pt has difficulty using vacuum, mopping floor, carrying laundry basket and groceries. Pt has constant aches. Pt denied headaches during the day, \"but I do during the night, I don't know what's causing that. \"    SUBJECTIVE:  See eval    OBJECTIVE:    Observation:    Test measurements:      CERV ROM       Cervical Flexion 25     Cervical Extension 15, B mid cervical pain     Cervical SB L 20, R neck pain  R 12   Cervical rotation L 60  R 40             ROM Left Right   Shoulder Flex 120 120  (PROM 140)   Shoulder Abd 110 100  (PROM 140)   Shoulder ER       Shoulder IR       IR behind back To T8 To T8           Strength  Left Right   Shoulder Flex 4/5 3-/5   Shoulder Scap 4/5 3-/5   Shoulder ER 5/5 4/5   Shoulder IR 5/5 5/5   Elbow flex 5/5 5/5   Elbow ext 5/5 5/5                 RESTRICTIONS/PRECAUTIONS: OA, osteopenia, DM, THR, TKR, Parkinson's, rotator cuff repair     Exercises/Interventions:   Therapeutic Ex (16000)  Min: Resistance/Repetitions CUES/Notes                       Manual Intervention  (51099)  Min:     STM R neck/shoulder 10'         NMR re-education (99073)  Min:               Therapeutic Activity (41761)  Min:               Modalities:  Min                 Other Therapeutic Activities:  Pt was educated on PT POC, Diagnosis, Prognosis, pathomechanics as well as frequency and duration of scheduling future physical therapy appointments. Time was also taken on this day to answer all patient questions and participation in PT. Reviewed appointment policy in detail with patient and patient verbalized understanding. Home Exercise Program:   Access Code: Sonya Hernandes: [email or web address]  Date: 08/18/2021  Prepared by: Dalton Shepard     Exercises  Standing Shoulder Shrugs - 1 x daily - 7 x weekly - 10 reps - 5 hold  Standing Scapular Retraction - 1 x daily - 7 x weekly - 10 reps - 5 hold  Standing Shoulder Flexion AAROM with Dowel - 1 x daily - 7 x weekly - 10 reps  Seated Cervical Rotation AROM - 1 x daily - 7 x weekly - 10 reps  Standing Shoulder External and Internal Rotation AROM - 1 x daily - 7 x weekly - 10 reps       Therapeutic Exercise and NMR EXR  [] (53205) Provided verbal/tactile cueing for activities related to strengthening, flexibility, endurance, ROM  for improvements in scapular, scapulothoracic and UE control with self care, reaching, carrying, lifting, house/yardwork, driving/computer work.    [] (33594) Provided verbal/tactile cueing for activities related to improving balance, coordination, kinesthetic sense, posture, motor skill, proprioception  to assist with  scapular, scapulothoracic and UE control with self care, reaching, carrying, lifting, house/yardwork, driving/computer work. Therapeutic Activities:    [] (90273 or 86737) Provided verbal/tactile cueing for activities related to improving balance, coordination, kinesthetic sense, posture, motor skill, proprioception and motor activation to allow for proper function of scapular, scapulothoracic and UE control with self care, carrying, lifting, driving/computer work.      Home Exercise Program:    [x] (92031) Reviewed/Progressed HEP activities related to strengthening, flexibility, endurance, ROM of scapular, scapulothoracic and UE control with self care, reaching, carrying, lifting, house/yardwork, driving/computer work  [] (94512) Reviewed/Progressed HEP activities related to improving balance, coordination, kinesthetic sense, posture, motor skill, proprioception of scapular, scapulothoracic and UE control with self care, reaching, carrying, lifting, house/yardwork, driving/computer work      Manual Treatments:  PROM / STM / Oscillations-Mobs:  G-I, II, III, IV (PA's, Inf., Post.)  [] (05480) Provided manual therapy to mobilize soft tissue/joints of cervical/CT, scapular GHJ and UE for the purpose of modulating pain, promoting relaxation,  increasing ROM, reducing/eliminating soft tissue swelling/inflammation/restriction, improving soft tissue extensibility and allowing for proper ROM for normal function with self care, reaching, carrying, lifting, house/yardwork, driving/computer work    Charges:  Timed Code Treatment Minutes: 25   Total Treatment Minutes: 45       [x] EVAL (LOW) 74072 (typically 20 minutes face-to-face)  [] EVAL (MOD) 83780 (typically 30 minutes face-to-face)  [] EVAL (HIGH) 04889 (typically 45 minutes face-to-face)  [] RE-EVAL     [x] CR(26072) x     [] Dry needle 1 or 2 Muscles (19580)  [] NMR (60602) x     [] Dry needle 3+ Muscles (69297)  [x] Manual (63039) x     [] Ultrasound (13423) x  [] TA (88438) x     [] Mech Traction (75897)  [] ES(attended) (18035)     [] ES (un) (89509):   [] Vasopump (00276) [] Ionto (55604)   [] Other:      GOALS:  Patient stated goal: \"being able to do stuff again\"  []? Progressing: []? Met: []? Not Met: []? Adjusted     Therapist goals for Patient:   Short Term Goals: To be achieved in: 2 weeks  1. Independent in HEP and progression per patient tolerance, in order to prevent re-injury. []? Progressing: []? Met: []? Not Met: []? Adjusted  2. Patient will have a decrease in pain to facilitate improvement in movement, function, and ADLs as indicated by Functional Deficits. []? Progressing: []? Met: []? Not Met: []? Adjusted     Long Term Goals: To be achieved in: 8 weeks  1. Disability index score of 20% or less for the Quick DASH to assist with reaching prior level of function. []? Progressing: []? Met: []? Not Met: []? Adjusted  2. Patient will demonstrate increased AROM R shoulder ABD to 130 degrees allow for proper joint functioning as indicated by Functional Deficits. []? Progressing: []? Met: []? Not Met: []? Adjusted  3. Patient will demonstrate an increase in NM recruitment/activation and overall GH and scapular strength to within n5lbs HHD or WNL for proper functional mobility as indicated by patients Functional Deficits. []? Progressing: []? Met: []? Not Met: []? Adjusted  4. 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 []? Progressing: []? Met: []? Not Met: []? Adjusted  5. Patient will be able to clean her house. []? Progressing: []? Met: []? Not Met: []?  Adjusted    ASSESSMENT:  See eval    Treatment/Activity Tolerance:  [x] Patient tolerated treatment well [] Patient limited by deloris  [] Patient limited by pain  [] Patient limited by other medical complications  [] Other:     Overall Progression Towards Functional goals/ Treatment Progress Update:  [] Patient is progressing as expected towards functional goals listed. [] Progression is slowed due to complexities/Impairments listed. [] Progression has been slowed due to co-morbidities. [x] Plan just implemented, too soon to assess goals progression <30days   [] Goals require adjustment due to lack of progress  [] Patient is not progressing as expected and requires additional follow up with physician  [] Other    Prognosis for POC: [x] Good [] Fair  [] Poor    Patient requires continued skilled intervention: [x] Yes  [] No      PLAN: See eval  [] Continue per plan of care [] Alter current plan (see comments)  [x] Plan of care initiated [] Hold pending MD visit [] Discharge    Electronically signed by: Carlene Schwab, PT     Note: If patient does not return for scheduled/recommended follow up visits, this note will serve as a discharge from care along with the most recent update on progress.

## 2021-08-23 ENCOUNTER — HOSPITAL ENCOUNTER (OUTPATIENT)
Dept: PHYSICAL THERAPY | Age: 76
Setting detail: THERAPIES SERIES
Discharge: HOME | End: 2021-08-23
Payer: MEDICARE

## 2021-08-23 PROCEDURE — 97110 THERAPEUTIC EXERCISES: CPT

## 2021-08-23 PROCEDURE — 97112 NEUROMUSCULAR REEDUCATION: CPT

## 2021-08-23 PROCEDURE — 97140 MANUAL THERAPY 1/> REGIONS: CPT

## 2021-08-23 NOTE — FLOWSHEET NOTE
[facility] [place] [facility] [address]. [place] [address]  Phone: [phone]   Fax:     [phone]        Physical Therapy Treatment Note/ Progress Report:       Date:  2021    Patient Name:  Rena Baumgarten    :    MRN: [id number]    Pertinent Medical History:     Medical/Treatment Diagnosis Information:  · Diagnosis: Sprain of right rotator cuff capsule, initial encounter (Q83.062C); Strain of neck muscle, initial encounter (S16.1XXA)  · Treatment Diagnosis: Decreased ADL status    Insurance/Certification information:  PT Insurance Information: [facility]  Physician Information:  Referring Practitioner: UNA Mendes  Plan of care signed (Y/N): N    Date of Patient follow up with Physician:      Progress Report: []  Yes  [x]  No     Date Range for reporting period:  Beginnin21  Ending:      Progress report due (10 Rx/or 30 days whichever is less):      Recertification due (POC duration/ or 90 days whichever is less):      Visit # POC/Insurance Allowable Auth Needed   2 16 []Yes    []No     Pain level:  5/10 R neck and shoulder     History of Injury:Patient had fall on 21 and injured her R shoulder and neck as she \"fell face forward\" with B arms outstretch. Pt had subacromial injection on 21 which \"helped a little bit. \"  Currently pain is located at the R lateral neck, R upper shoulder, R anterior chest, R scapula. Pt stated \"I don't have any strength\" and pt has difficulty using vacuum, mopping floor, carrying laundry basket and groceries. Pt has constant aches. Pt denied headaches during the day, \"but I do during the night, I don't know what's causing that. \"    SUBJECTIVE:  See zoey  : Kind of sore today (R UT).  Her family had a surprise birthday party for her yesterday, so she had a busy day      OBJECTIVE:    Observation:    Test measurements:      CERV ROM     Cervical Flexion 25     Cervical Extension 15, B mid cervical pain     Cervical SB L 20, R neck pain  R 12     Cervical rotation L 60  R 40             ROM Left Right   Shoulder Flex 120 120  (PROM 140)   Shoulder Abd 110 100  (PROM 140)   Shoulder ER       Shoulder IR       IR behind back To T8 To T8           Strength  Left Right   Shoulder Flex 4/5 3-/5   Shoulder Scap 4/5 3-/5   Shoulder ER 5/5 4/5   Shoulder IR 5/5 5/5   Elbow flex 5/5 5/5   Elbow ext 5/5 5/5                 RESTRICTIONS/PRECAUTIONS: OA, osteopenia, DM, THR, TKR, Parkinson's, rotator cuff repair     Exercises/Interventions:   Therapeutic Ex (24475)  Min: 10 Resistance/Repetitions CUES/Notes   Pulley flexion 20x    UE [place] flexion 10x    Tband Row             Ext Yellow x10  Yellow x10         Manual Intervention  (60783)  Min: 20     STM R neck/shoulder 10' Seated   PROM R shoulder  5x    NMR re-education (70189)  Min: 10     Isometrics: all directions R shoulder  5 x 5\"         Therapeutic Activity (19003)  Min:               Modalities:  Min                 Other Therapeutic Activities:  Pt was educated on PT POC, Diagnosis, Prognosis, pathomechanics as well as frequency and duration of scheduling future physical therapy appointments. Time was also taken on this day to answer all patient questions and participation in PT. Reviewed appointment policy in detail with patient and patient verbalized understanding. Home Exercise Program:   Access Code: Farideh Guillen: [email or web address]  Date: 08/18/2021  Prepared by: Sharon Mina     Exercises  Standing Shoulder Shrugs - 1 x daily - 7 x weekly - 10 reps - 5 hold  Standing Scapular Retraction - 1 x daily - 7 x weekly - 10 reps - 5 hold  Standing Shoulder Flexion AAROM with Dowel - 1 x daily - 7 x weekly - 10 reps  Seated Cervical Rotation AROM - 1 x daily - 7 x weekly - 10 reps  Standing Shoulder External and Internal Rotation AROM - 1 x daily - 7 x weekly - 10 reps       Therapeutic Exercise and NMR EXR  [] (07443) Provided verbal/tactile cueing for activities related to strengthening, flexibility, endurance, ROM  for improvements in scapular, scapulothoracic and UE control with self care, reaching, carrying, lifting, house/yardwork, driving/computer work.    [] (55023) Provided verbal/tactile cueing for activities related to improving balance, coordination, kinesthetic sense, posture, motor skill, proprioception  to assist with  scapular, scapulothoracic and UE control with self care, reaching, carrying, lifting, house/yardwork, driving/computer work. Therapeutic Activities:    [] (31194 or 28047) Provided verbal/tactile cueing for activities related to improving balance, coordination, kinesthetic sense, posture, motor skill, proprioception and motor activation to allow for proper function of scapular, scapulothoracic and UE control with self care, carrying, lifting, driving/computer work.      Home Exercise Program:    [x] (45194) Reviewed/Progressed HEP activities related to strengthening, flexibility, endurance, ROM of scapular, scapulothoracic and UE control with self care, reaching, carrying, lifting, house/yardwork, driving/computer work  [] (01810) Reviewed/Progressed HEP activities related to improving balance, coordination, kinesthetic sense, posture, motor skill, proprioception of scapular, scapulothoracic and UE control with self care, reaching, carrying, lifting, house/yardwork, driving/computer work      Manual Treatments:  PROM / STM / Oscillations-Mobs:  G-I, II, III, IV (PA's, Inf., Post.)  [] (72874) Provided manual therapy to mobilize soft tissue/joints of cervical/CT, scapular GHJ and UE for the purpose of modulating pain, promoting relaxation,  increasing ROM, reducing/eliminating soft tissue swelling/inflammation/restriction, improving soft tissue extensibility and allowing for proper ROM for normal function with self care, reaching, carrying, lifting, house/yardwork, driving/computer work    Charges:  Timed Code Treatment Minutes: 40   Total Treatment Minutes: 40       [] EVAL (LOW) 85973 (typically 20 minutes face-to-face)  [] EVAL (MOD) 49568 (typically 30 minutes face-to-face)  [] EVAL (HIGH) 21834 (typically 45 minutes face-to-face)  [] RE-EVAL     [x] PO(47829) x     [] Dry needle 1 or 2 Muscles (54428)  [x] NMR (47102) x     [] Dry needle 3+ Muscles (31996)  [x] Manual (20837) x     [] Ultrasound (11769) x  [] TA (49405) x     [] Mech Traction (31440)  [] ES(attended) (07976)     [] ES (un) (03698):   [] Vasopump (54699) [] Ionto (54844)   [] Other:      GOALS:  Patient stated goal: \"being able to do stuff again\"  []? Progressing: []? Met: []? Not Met: []? Adjusted     Therapist goals for Patient:   Short Term Goals: To be achieved in: 2 weeks  1. Independent in HEP and progression per patient tolerance, in order to prevent re-injury. []? Progressing: []? Met: []? Not Met: []? Adjusted  2. Patient will have a decrease in pain to facilitate improvement in movement, function, and ADLs as indicated by Functional Deficits. []? Progressing: []? Met: []? Not Met: []? Adjusted     Long Term Goals: To be achieved in: 8 weeks  1. Disability index score of 20% or less for the Quick DASH to assist with reaching prior level of function. []? Progressing: []? Met: []? Not Met: []? Adjusted  2. Patient will demonstrate increased AROM R shoulder ABD to 130 degrees allow for proper joint functioning as indicated by Functional Deficits. []? Progressing: []? Met: []? Not Met: []? Adjusted  3. Patient will demonstrate an increase in NM recruitment/activation and overall GH and scapular strength to within n5lbs HHD or WNL for proper functional mobility as indicated by patients Functional Deficits. []? Progressing: []? Met: []? Not Met: []? Adjusted  4. 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 []?  Progressing: []? Met: []? Not Met: []? Adjusted  5. Patient will be able to clean her house. []? Progressing: []? Met: []? Not Met: []? Adjusted    ASSESSMENT:  See eval    Treatment/Activity Tolerance:  [x] Patient tolerated treatment well [] Patient limited by fatique  [] Patient limited by pain  [] Patient limited by other medical complications  [] Other:      Overall Progression Towards Functional goals/ Treatment Progress Update:  [] Patient is progressing as expected towards functional goals listed. [] Progression is slowed due to complexities/Impairments listed. [] Progression has been slowed due to co-morbidities. [x] Plan just implemented, too soon to assess goals progression <30days   [] Goals require adjustment due to lack of progress  [] Patient is not progressing as expected and requires additional follow up with physician  [] Other    Prognosis for POC: [x] Good [] Fair  [] Poor    Patient requires continued skilled intervention: [x] Yes  [] No      PLAN: See eval  [x] Continue per plan of care [] Alter current plan (see comments)  [] Plan of care initiated [] Hold pending MD visit [] Discharge    Electronically signed by: Kristina Prakash PTA     Note: If patient does not return for scheduled/recommended follow up visits, this note will serve as a discharge from care along with the most recent update on progress.

## 2021-08-25 ENCOUNTER — APPOINTMENT (OUTPATIENT)
Dept: PHYSICAL THERAPY | Age: 76
End: 2021-08-25
Payer: MEDICARE

## 2021-08-30 ENCOUNTER — HOSPITAL ENCOUNTER (OUTPATIENT)
Dept: PHYSICAL THERAPY | Age: 76
Setting detail: THERAPIES SERIES
Discharge: HOME | End: 2021-08-30
Payer: MEDICARE

## 2021-08-30 PROCEDURE — 97110 THERAPEUTIC EXERCISES: CPT

## 2021-08-30 PROCEDURE — 97140 MANUAL THERAPY 1/> REGIONS: CPT

## 2021-08-30 PROCEDURE — 97112 NEUROMUSCULAR REEDUCATION: CPT

## 2021-08-30 NOTE — FLOWSHEET NOTE
[facility], [facility] [address] [place] [address]  Phone: [phone]   Fax:     [phone]        Physical Therapy Treatment Note/ Progress Report:     Physical Therapy Re-Certification Plan of Care    Dear UNA Rodriguez  ,    We had the pleasure of treating the following patient for physical therapy services at [address] [place] [address]. Pt reports falling again on 8/24/21 at [facility]; has a lot of bruising on L lateral thigh and L frontal head - both improving. Pt states R wrist is not feeling better - PT will send note to [place]. Pt is wearing R wrist splint with thumb spica. Pt states she had no warning and just went down on the concrete. Pt did not go to the ER. Pt's  is having heart surgery on Wednesday and was at the hospital for a pre-op visit, so pt did not want to go to the ER. Pt states she does not know what is causing the falls. Pt states she did not lose consciousness. Pt has tenderness to palpation in L UE first web space - soft tissue vs directly on bone. PT feels pt can wait until her next visit with UNA Rodriguez for recent fall. Cause of falls is unknown, and that is concerning to PT and pt. Pt has a cardiologist - PT suggested pt make an appointment. Pt is more concerned about her  who is having aortic valve replaced on Wednesday.   Thank you for the referral.     Physician Signature:________________________________Date:__________________  By signing above (or electronic signature), therapists plan is approved by physician      Overall Response to Treatment:   [x]Patient is responding well to treatment and improvement is noted with regards  to goals   []Patient should continue to improve in reasonable time if they continue HEP   []Patient has plateaued and is no longer responding to skilled PT intervention    []Patient is getting worse and would benefit from return to referring MD   []Patient unable to adhere to initial POC   []Other:     Date range of Visits: 21-21  Total Visits: 3    Recommendation:    [x]Continue2 PT x / wk for 3-4 weeks. []Hold PT, pending MD visit        Date:  2021    Patient Name:  Kylie Pope    :    MRN: [id number]    Pertinent Medical History:     Medical/Treatment Diagnosis Information:  · Diagnosis: Sprain of right rotator cuff capsule, initial encounter (P67.347Y); Strain of neck muscle, initial encounter (S16.1XXA)  · Treatment Diagnosis: Decreased ADL status    Insurance/Certification information:  PT Insurance Information: [facility]  Physician Information:  Referring Practitioner: UNA Ambrocio  Plan of care signed (Y/N): N    Date of Patient follow up with Physician:      Progress Report: []  Yes  [x]  No     Date Range for reporting period:  Beginnin21  Ending:      Progress report due (10 Rx/or 30 days whichever is less):      Recertification due (POC duration/ or 90 days whichever is less):      Visit # POC/Insurance Allowable Auth Needed   3 16 []Yes    []No     Pain level:  5/10 R neck and shoulder     History of Injury:Patient had fall on 21 and injured her R shoulder and neck as she \"fell face forward\" with B arms outstretch. Pt had subacromial injection on 21 which \"helped a little bit. \"  Currently pain is located at the R lateral neck, R upper shoulder, R anterior chest, R scapula. Pt stated \"I don't have any strength\" and pt has difficulty using vacuum, mopping floor, carrying laundry basket and groceries. Pt has constant aches. Pt denied headaches during the day, \"but I do during the night, I don't know what's causing that. \"    SUBJECTIVE:  See zoey  : Kind of sore today (R UT).  Her family had a surprise birthday party for her yesterday, so she had a busy day   Pt reports falling again on 21 at [facility]; has a lot of bruising on L lateral thigh and L frontal head - both improving. Pt states R wrist is not feeling better - PT will send note to [place]. Pt is wearing R wrist splint with thumb spica. Pt states she had no warning and just went down on the concrete. Pt did not go to the ER. Pt's  is having heart surgery on Wednesday and was at the hospital for a pre-op visit, so pt did not want to go to the ER. Pt states she does not know what is causing the falls. Pt states she did not lose consciousness. OBJECTIVE:    Observation:    Test measurements:      CERV ROM       Cervical Flexion 25     Cervical Extension 15, B mid cervical pain     Cervical SB L 20, R neck pain  R 12     Cervical rotation L 60  R 40             ROM Left Right   Shoulder Flex 120 120  (PROM 140)   Shoulder Abd 110 100  (PROM 140)   Shoulder ER       Shoulder IR       IR behind back To T8 To T8           Strength  Left Right   Shoulder Flex 4/5 3-/5   Shoulder Scap 4/5 3-/5   Shoulder ER 5/5 4/5   Shoulder IR 5/5 5/5   Elbow flex 5/5 5/5   Elbow ext 5/5 5/5                 RESTRICTIONS/PRECAUTIONS: OA, osteopenia, DM, THR, TKR, Parkinson's, rotator cuff repair     Exercises/Interventions:   Therapeutic Ex (40919)  Min: 10 Resistance/Repetitions CUES/Notes   Pulley flexion 20x    Hold due to recent fall 8/30   Tband Row             Ext Yellow x10  Yellow x10   8/30 Yellow tubing        Manual Intervention  (40131)  Min: 20     STM R neck/shoulder 10' Seated   PROM R shoulder  10x    NMR re-education (40766)  Min: 10     Isometrics: all directions R shoulder  5 x 5\"         Therapeutic Activity (08244)  Min:               Modalities:  Min                 Other Therapeutic Activities:  Pt was educated on PT POC, Diagnosis, Prognosis, pathomechanics as well as frequency and duration of scheduling future physical therapy appointments. Time was also taken on this day to answer all patient questions and participation in PT.  Reviewed appointment scapular, scapulothoracic and UE control with self care, reaching, carrying, lifting, house/yardwork, driving/computer work      Manual Treatments:  PROM / STM / Oscillations-Mobs:  G-I, II, III, IV (PA's, Inf., Post.)  [] (36015) Provided manual therapy to mobilize soft tissue/joints of cervical/CT, scapular GHJ and UE for the purpose of modulating pain, promoting relaxation,  increasing ROM, reducing/eliminating soft tissue swelling/inflammation/restriction, improving soft tissue extensibility and allowing for proper ROM for normal function with self care, reaching, carrying, lifting, house/yardwork, driving/computer work    Charges:  Timed Code Treatment Minutes: 40   Total Treatment Minutes: 40       [] EVAL (LOW) 46634 (typically 20 minutes face-to-face)  [] EVAL (MOD) 26794 (typically 30 minutes face-to-face)  [] EVAL (HIGH) 07635 (typically 45 minutes face-to-face)  [] RE-EVAL     [x] GZ(44503) x   1  [] Dry needle 1 or 2 Muscles (01565)  [x] NMR (45290) x  1   [] Dry needle 3+ Muscles (33444)  [x] Manual (44541) x    1 [] Ultrasound (83775) x  [] TA (27017) x     [] Mech Traction (69061)  [] ES(attended) (42631)     [] ES (un) (46957):   [] Vasopump (48370) [] Ionto (95840)   [] Other:      GOALS:  Patient stated goal: \"being able to do stuff again\"  []? Progressing: []? Met: []? Not Met: []? Adjusted     Therapist goals for Patient:   Short Term Goals: To be achieved in: 2 weeks  1. Independent in HEP and progression per patient tolerance, in order to prevent re-injury. []? Progressing: []? Met: []? Not Met: []? Adjusted  2. Patient will have a decrease in pain to facilitate improvement in movement, function, and ADLs as indicated by Functional Deficits. []? Progressing: []? Met: []? Not Met: []? Adjusted     Long Term Goals: To be achieved in: 8 weeks  1. Disability index score of 20% or less for the Quick DASH to assist with reaching prior level of function. []? Progressing: []? Met: []?  Not Met: []? Adjusted  2. Patient will demonstrate increased AROM R shoulder ABD to 130 degrees allow for proper joint functioning as indicated by Functional Deficits. []? Progressing: []? Met: []? Not Met: []? Adjusted  3. Patient will demonstrate an increase in NM recruitment/activation and overall GH and scapular strength to within n5lbs HHD or WNL for proper functional mobility as indicated by patients Functional Deficits. []? Progressing: []? Met: []? Not Met: []? Adjusted  4. 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 []? Progressing: []? Met: []? Not Met: []? Adjusted  5. Patient will be able to clean her house. []? Progressing: []? Met: []? Not Met: []? Adjusted    ASSESSMENT:  Pt has tenderness to palpation in L UE first web space - soft tissue vs directly on bone. PT feels pt can wait until her next visit with UNA Briceño for recent fall. Cause of falls is unknown, and that is concerning to PT and pt. Pt has a cardiologist - PT suggested pt make an appointment. Pt is more concerned about her  who is having aortic valve replaced on Wednesday. Treatment/Activity Tolerance:  [x] Patient tolerated treatment well [] Patient limited by fatique  [] Patient limited by pain  [] Patient limited by other medical complications  [] Other:      Overall Progression Towards Functional goals/ Treatment Progress Update:  [] Patient is progressing as expected towards functional goals listed. [] Progression is slowed due to complexities/Impairments listed. [] Progression has been slowed due to co-morbidities.   [x] Plan just implemented, too soon to assess goals progression <30days   [] Goals require adjustment due to lack of progress  [] Patient is not progressing as expected and requires additional follow up with physician  [] Other    Prognosis for POC: [x] Good [] Fair  [] Poor    Patient requires continued skilled intervention: [x] Yes  [] No      PLAN: See eval  [x] Continue per plan of care [] Alter current plan (see comments)  [] Plan of care initiated [] Hold pending MD visit [] Discharge    Electronically signed by: Eva Whitney PT     Note: If patient does not return for scheduled/recommended follow up visits, this note will serve as a discharge from care along with the most recent update on progress.

## 2021-09-01 ENCOUNTER — APPOINTMENT (OUTPATIENT)
Dept: PHYSICAL THERAPY | Age: 76
End: 2021-09-01
Payer: MEDICARE

## 2021-09-08 ENCOUNTER — APPOINTMENT (OUTPATIENT)
Dept: PHYSICAL THERAPY | Age: 76
End: 2021-09-08
Payer: MEDICARE

## 2021-09-09 ENCOUNTER — OFFICE VISIT (OUTPATIENT)
Dept: ORTHOPEDIC SURGERY | Age: 76
End: 2021-09-09
Payer: MEDICARE

## 2021-09-09 VITALS — HEIGHT: 62 IN | WEIGHT: 186 LBS | RESPIRATION RATE: 18 BRPM | BODY MASS INDEX: 34.23 KG/M2

## 2021-09-09 DIAGNOSIS — Z96.651 HISTORY OF TOTAL KNEE ARTHROPLASTY, RIGHT: Primary | ICD-10-CM

## 2021-09-09 DIAGNOSIS — Z96.652 HISTORY OF TOTAL KNEE ARTHROPLASTY, LEFT: ICD-10-CM

## 2021-09-09 DIAGNOSIS — Z96.641 HISTORY OF TOTAL HIP ARTHROPLASTY, RIGHT: ICD-10-CM

## 2021-09-09 DIAGNOSIS — Z96.642 HISTORY OF TOTAL HIP ARTHROPLASTY, LEFT: ICD-10-CM

## 2021-09-09 PROCEDURE — 1036F TOBACCO NON-USER: CPT | Performed by: PHYSICIAN ASSISTANT

## 2021-09-09 PROCEDURE — 4040F PNEUMOC VAC/ADMIN/RCVD: CPT | Performed by: PHYSICIAN ASSISTANT

## 2021-09-09 PROCEDURE — G8427 DOCREV CUR MEDS BY ELIG CLIN: HCPCS | Performed by: PHYSICIAN ASSISTANT

## 2021-09-09 PROCEDURE — G8417 CALC BMI ABV UP PARAM F/U: HCPCS | Performed by: PHYSICIAN ASSISTANT

## 2021-09-09 PROCEDURE — 99213 OFFICE O/P EST LOW 20 MIN: CPT | Performed by: PHYSICIAN ASSISTANT

## 2021-09-09 PROCEDURE — 1123F ACP DISCUSS/DSCN MKR DOCD: CPT | Performed by: PHYSICIAN ASSISTANT

## 2021-09-09 PROCEDURE — 1090F PRES/ABSN URINE INCON ASSESS: CPT | Performed by: PHYSICIAN ASSISTANT

## 2021-09-09 PROCEDURE — G8400 PT W/DXA NO RESULTS DOC: HCPCS | Performed by: PHYSICIAN ASSISTANT

## 2021-09-09 PROCEDURE — 3017F COLORECTAL CA SCREEN DOC REV: CPT | Performed by: PHYSICIAN ASSISTANT

## 2021-09-09 NOTE — PROGRESS NOTES
Subjective:      Patient ID: Judy Mccain is a 78 y.o.  female. Chief Complaint   Patient presents with    Hip Problem     Right GRACIA 1.31.2014    Hip Problem     Left GRACIA 12.2014    Knee Pain     Left TKA 2007    Knee Pain     Right TKA 12.8.2017        HPI:  Here for annual follow up visit. S/P bilateral knee and bilateral hip arthroplasty. The date of procedure-right total knee 1/31/2014, left total knee 2007, left total hip 12/2014, right total knee 12/8/2017. Issues or complaints: No new complaints. Review of Systems:   A 14 point review of systems and history form completed by the patient has been reviewed. This form is scanned in the media tab of the patient's chart under 7/6/2021 date.      Past Medical History:   Diagnosis Date    Achalasia     Anemia     Arthritis     Breast cancer ([facility] [address] 2011    left breast mastectomy    Clostridium difficile infection 2/25/14    Diabetes mellitus ([facility] [address]     diet controlled    HBP (high blood pressure)     Hearing impairment     Hyperlipidemia     Nausea & vomiting     PONV (postoperative nausea and vomiting)     Sinus disorder     Tremor of both hands     Wears glasses     Wears hearing aid     bilat    Weight disorder        Family History   Problem Relation Age of Onset    Arthritis Other     Cancer Other     Heart Disease Other     High Blood Pressure Other     Stroke Other        Past Surgical History:   Procedure Laterality Date    BREAST LUMPECTOMY  2011    BREAST SURGERY  left masectomy    ESOPHAGUS SURGERY      HAND SURGERY Right     HYSTERECTOMY      JOINT REPLACEMENT  left knee replacement 2007    JOINT REPLACEMENT Right 1/31/14    Right total hip replacement    JOINT REPLACEMENT Left 12/2014    hip     JOINT REPLACEMENT Right     SHOULDER SURGERY      TONSILLECTOMY         Social History     Occupational History    Not on file   Tobacco Use    Smoking status: Never Smoker    Smokeless tobacco: Never Used Vaping Use    Vaping Use: Never used   Substance and Sexual Activity    Alcohol use: No     Alcohol/week: 0.0 standard drinks    Drug use: No    Sexual activity: Not on file       Current Outpatient Medications   Medication Sig Dispense Refill    cephALEXin (KEFLEX) 500 MG capsule Take 1 capsule by mouth See Admin Instructions Take 2 tablets one hour before and 2 tablets one hour after dental procedure 4 capsule 0    pravastatin (PRAVACHOL) 20 MG tablet Take 1 tablet by mouth nightly 30 tablet 0    HYDROcodone-acetaminophen (NORCO) 5-325 MG per tablet Take 1 tablet by mouth every 6 hours as needed for Pain . 60 tablet 0    metFORMIN (GLUCOPHAGE-XR) 500 MG extended release tablet Take 1,000 mg by mouth      triamcinolone (KENALOG) 0.1 % ointment APPLY TO SKIN BID FOR 5 DAYS / ONLY USE IF NYSTATIN NOT IMPROVING.  0    esomeprazole Magnesium (NEXIUM) 20 MG PACK Take 20 mg by mouth daily      Calcium Citrate-Vitamin D (CITRACAL + D PO) Take 1 capsule by mouth daily      rOPINIRole (REQUIP) 0.5 MG tablet Take 0.5 mg by mouth 2 times daily 1 tab in am and 2 tabs in pm      losartan (COZAAR) 100 MG tablet Take 100 mg by mouth daily. No current facility-administered medications for this visit. Objective:   She is alert, oriented x 3, pleasant, well nourished, developed and in no acute distress. Resp 18   Ht 5' 2\" (1.575 m)   Wt 186 lb (84.4 kg)   BMI 34.02 kg/m²      Examination of the left and right knee: Inspection of the skin demonstrates a well-healed midline incision. Inspection of the soft tissues without significant swelling or erythema. The overall alignment of the knee is neutral.  There is minimal intra-articular effusion. AROM     Extension 0     Flexion  125   There no pain associated with ROM testing. Pes anserine bursa non-tender to palpation. Patellar tendon non-tender to palpation. Quadriceps tendon non-tender to palpation.   Collateral ligaments non-tender to palpation. Popliteal fossa non-tender to palpation. Retro patellar crepitus is not present. There is no instability with varus/valgus stress testing in full extension or 90 degrees of flexion. There is no instability with anterior drawer testing. Extensor Mechanism is intact. Examination of the left and right hip:  No pain with active ROM. No pain with passive ROM. No instability, crepitus with ROM. No pain with weight bearing. Gait is normal.      Examination of the lower extremities are intact with sensation to light touch. Motor testing  5/5 in all major motor groups of the lower extremities. SLR negative. Examination of the lower extremities shows intact perfusion to all extremities. No cyanosis. Digits are warm to touch, capillary refill is less than 2 seconds. There is no edema noted. Examination of the skin over both lower extremities reveals: The skin to be intact without lacerations or abrasions. No significant erythema. No rashes or skin lesions. X Rays: was performed in the office today:   AP Standing, Lateral and Sunrise Left Knee: There is a left cemented knee arthroplasty present. The alignment is satisfactory. There are no signs of failure or loosening. AP Standing, Lateral and Sunrise Right Knee: There is a right cemented total knee arthroplasty present. The alignment is satisfactory. There are no signs of failure or loosening. AP Pelvis, AP and Frog Leg Lateral Left and Right Hip:   There is a left and right total hip arthroplasty present. The alignment is satisfactory. There are no signs of failure, dislocation or loosening. Diagnosis:        ICD-10-CM    1. History of total knee arthroplasty, right  Z96.651 XR KNEE BILATERAL STANDING     XR KNEE RIGHT (1-2 VIEWS)   2. History of total knee arthroplasty, left  Z96.652 XR KNEE BILATERAL STANDING     XR KNEE LEFT (1-2 VIEWS)   3.  History of total hip arthroplasty, right  Z96.641 XR HIP 3-4 VW W PELVIS BILATERAL   4. History of total hip arthroplasty, left  Z96.642 XR HIP 3-4 VW W PELVIS BILATERAL          Assessment/ Plan:     Assessment:    Clinically and radiographically stable bilateral knee and bilateral hip arthroplasties. Plan:  Medications-I have discussed risk and benefits of prophylactic antibiotics. Follow up-annually with x-rays and clinical exam of both knees and both hips. Call or return to clinic if these symptoms worsen or fail to improve as anticipated.

## 2021-09-13 ENCOUNTER — HOSPITAL ENCOUNTER (OUTPATIENT)
Dept: PHYSICAL THERAPY | Age: 76
Setting detail: THERAPIES SERIES
Discharge: HOME | End: 2021-09-13
Payer: MEDICARE

## 2021-09-13 PROCEDURE — 97140 MANUAL THERAPY 1/> REGIONS: CPT

## 2021-09-13 PROCEDURE — 97112 NEUROMUSCULAR REEDUCATION: CPT

## 2021-09-13 PROCEDURE — 97110 THERAPEUTIC EXERCISES: CPT

## 2021-09-13 NOTE — FLOWSHEET NOTE
[facility], [facility] [address] [place] [address]  Phone: [phone]   Fax:     [phone]        Physical Therapy Treatment Note/ Progress Report:     Physical Therapy Re-Certification Plan of Care    Dear UNA Bettencourt  ,    We had the pleasure of treating the following patient for physical therapy services at [address] [place] [address]. Pt reports falling again on 8/24/21 at [facility]; has a lot of bruising on L lateral thigh and L frontal head - both improving. Pt states R wrist is not feeling better - PT will send note to PA. Pt is wearing R wrist splint with thumb spica. Pt states she had no warning and just went down on the concrete. Pt did not go to the ER. Pt's  is having heart surgery on Wednesday and was at the hospital for a pre-op visit, so pt did not want to go to the ER. Pt states she does not know what is causing the falls. Pt states she did not lose consciousness. Pt has tenderness to palpation in L UE first web space - soft tissue vs directly on bone. PT feels pt can wait until her next visit with UNA Bettencourt for recent fall. Cause of falls is unknown, and that is concerning to PT and pt. Pt has a cardiologist - PT suggested pt make an appointment. Pt is more concerned about her  who is having aortic valve replaced on Wednesday.   Thank you for the referral.     Physician Signature:________________________________Date:__________________  By signing above (or electronic signature), therapists plan is approved by physician      Overall Response to Treatment:   [x]Patient is responding well to treatment and improvement is noted with regards  to goals   []Patient should continue to improve in reasonable time if they continue HEP   []Patient has plateaued and is no longer responding to skilled PT intervention    []Patient is getting worse and would benefit from return to referring MD   []Patient unable to adhere to initial POC   []Other:     Date range of Visits: 21-21  Total Visits: 3    Recommendation:    [x]Continue2 PT x / wk for 3-4 weeks. []Hold PT, pending MD visit        Date:  2021    Patient Name:  Rena Baumgarten    :    MRN: [id number]    Pertinent Medical History:     Medical/Treatment Diagnosis Information:  · Diagnosis: Sprain of right rotator cuff capsule, initial encounter (A61.326F); Strain of neck muscle, initial encounter (S16.1XXA)  · Treatment Diagnosis: Decreased ADL status    Insurance/Certification information:  PT Insurance Information: [facility]  Physician Information:  Referring Practitioner: UNA Mendes  Plan of care signed (Y/N): N    Date of Patient follow up with Physician:      Progress Report: []  Yes  [x]  No     Date Range for reporting period:  Beginnin21  Ending:      Progress report due (10 Rx/or 30 days whichever is less):      Recertification due (POC duration/ or 90 days whichever is less):      Visit # POC/Insurance Allowable Auth Needed   4 16 []Yes    []No     Pain level:  5/10 R neck and shoulder     History of Injury:Patient had fall on 21 and injured her R shoulder and neck as she \"fell face forward\" with B arms outstretch. Pt had subacromial injection on 21 which \"helped a little bit. \"  Currently pain is located at the R lateral neck, R upper shoulder, R anterior chest, R scapula. Pt stated \"I don't have any strength\" and pt has difficulty using vacuum, mopping floor, carrying laundry basket and groceries. Pt has constant aches. Pt denied headaches during the day, \"but I do during the night, I don't know what's causing that. \"    SUBJECTIVE:  See zoey  : Kind of sore today (R UT).  Her family had a surprise birthday party for her yesterday, so she had a busy day   Pt reports falling again on 21 at [facility]; has a lot of bruising on L lateral thigh and L frontal head - both improving. Pt states R wrist is not feeling better - PT will send note to PA. Pt is wearing R wrist splint with thumb spica. Pt states she had no warning and just went down on the concrete. Pt did not go to the ER. Pt's  is having heart surgery on Wednesday and was at the hospital for a pre-op visit, so pt did not want to go to the ER. Pt states she does not know what is causing the falls. Pt states she did not lose consciousness. 9/13/21 pt stated she has not followed up with her doctor (primary or cardiologist) following 8/30/21 PT session. Pt stated her Nonda Poot is still kind of tight, but I'm doing better. \"  Pt stated she can reach higher during her HEP. Pt rated pain 2/10 and is located primarily at the R upper shoulder/R lateral neck; pointed toward R levator scapula.       OBJECTIVE:    Observation:    Test measurements:      CERV ROM       Cervical Flexion 25     Cervical Extension 15, B mid cervical pain     Cervical SB L 20, R neck pain  R 12     Cervical rotation L 60  R 40             ROM Left Right   Shoulder Flex 120 120  (PROM 140)   Shoulder Abd 110 100  (PROM 140)   Shoulder ER       Shoulder IR       IR behind back To T8 To T8           Strength  Left Right   Shoulder Flex 4/5 3-/5   Shoulder Scap 4/5 3-/5   Shoulder ER 5/5 4/5   Shoulder IR 5/5 5/5   Elbow flex 5/5 5/5   Elbow ext 5/5 5/5                 RESTRICTIONS/PRECAUTIONS: OA, osteopenia, DM, THR, TKR, Parkinson's, rotator cuff repair     Exercises/Interventions:   Therapeutic Ex (73121)  Min: 10 Resistance/Repetitions CUES/Notes   Pulley flexion 30x    AAROM R shoulder  Flexion  Scaption  In sitting   Tband Row             Ext Yellow x10  Yellow x10           Manual Intervention  (14865)  Min: 15     STM R neck/shoulder STM R levator scap/supraspinatus Seated        NMR re-education (58689)  Min: 10     Roll ball on table  Flexion  Scaption   30x  30x    [place] re-injury. []? Progressing: []? Met: []? Not Met: []? Adjusted  2. Patient will have a decrease in pain to facilitate improvement in movement, function, and ADLs as indicated by Functional Deficits. []? Progressing: []? Met: []? Not Met: []? Adjusted     Long Term Goals: To be achieved in: 8 weeks  1. Disability index score of 20% or less for the Quick DASH to assist with reaching prior level of function. []? Progressing: []? Met: []? Not Met: []? Adjusted  2. Patient will demonstrate increased AROM R shoulder ABD to 130 degrees allow for proper joint functioning as indicated by Functional Deficits. []? Progressing: []? Met: []? Not Met: []? Adjusted  3. Patient will demonstrate an increase in NM recruitment/activation and overall GH and scapular strength to within n5lbs HHD or WNL for proper functional mobility as indicated by patients Functional Deficits. []? Progressing: []? Met: []? Not Met: []? Adjusted  4. 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 []? Progressing: []? Met: []? Not Met: []? Adjusted  5. Patient will be able to clean her house. []? Progressing: []? Met: []? Not Met: []? Adjusted    ASSESSMENT:  Pt has tenderness to palpation in L UE first web space - soft tissue vs directly on bone. PT feels pt can wait until her next visit with UNA Cox for recent fall. Cause of falls is unknown, and that is concerning to PT and pt. Pt has a cardiologist - PT suggested pt make an appointment. Pt is more concerned about her  who is having aortic valve replaced on Wednesday. Treatment/Activity Tolerance:  [x] Patient tolerated treatment well [] Patient limited by fatique  [] Patient limited by pain  [] Patient limited by other medical complications  [] Other:      Overall Progression Towards Functional goals/ Treatment Progress Update:  [] Patient is progressing as expected towards functional goals listed.     [] Progression is slowed

## 2021-09-15 ENCOUNTER — HOSPITAL ENCOUNTER (OUTPATIENT)
Dept: PHYSICAL THERAPY | Age: 76
Setting detail: THERAPIES SERIES
Discharge: HOME | End: 2021-09-15
Payer: MEDICARE

## 2021-09-15 PROCEDURE — 97140 MANUAL THERAPY 1/> REGIONS: CPT

## 2021-09-15 PROCEDURE — 97112 NEUROMUSCULAR REEDUCATION: CPT

## 2021-09-15 PROCEDURE — 97110 THERAPEUTIC EXERCISES: CPT

## 2021-09-15 NOTE — FLOWSHEET NOTE
David Alex and [facility], [facility] [address] [place] [address]  Phone: [phone]   Fax:     [phone]              Date:  9/15/2021    Patient Name:  Lou Shabazz    :    MRN: [id number]    Pertinent Medical History:     Medical/Treatment Diagnosis Information:  · Diagnosis: Sprain of right rotator cuff capsule, initial encounter (X46.705H); Strain of neck muscle, initial encounter (S16.1XXA)  · Treatment Diagnosis: Decreased ADL status    Insurance/Certification information:  PT Insurance Information: [facility]  Physician Information:  Referring Practitioner: UNA Avery  Plan of care signed (Y/N): N    Date of Patient follow up with Physician:      Progress Report: []  Yes  [x]  No     Date Range for reporting period:  Beginnin21  Ending:      Progress report due (10 Rx/or 30 days whichever is less):      Recertification due (POC duration/ or 90 days whichever is less):      Visit # POC/Insurance Allowable Auth Needed   5 16 []Yes    []No     Pain level:  10 R neck and shoulder     History of Injury:Patient had fall on 21 and injured her R shoulder and neck as she \"fell face forward\" with B arms outstretch. Pt had subacromial injection on 21 which \"helped a little bit. \"  Currently pain is located at the R lateral neck, R upper shoulder, R anterior chest, R scapula. Pt stated \"I don't have any strength\" and pt has difficulty using vacuum, mopping floor, carrying laundry basket and groceries. Pt has constant aches. Pt denied headaches during the day, \"but I do during the night, I don't know what's causing that. \"    SUBJECTIVE:  See eval  : Kind of sore today (R UT).  Her family had a surprise birthday party for her yesterday, so she had a busy day   Pt reports falling again on 21 at [facility]; has a lot of bruising on L lateral thigh and L frontal head - both improving. Pt states R wrist is not feeling better - PT will send note to PA. Pt is wearing R wrist splint with thumb spica. Pt states she had no warning and just went down on the concrete. Pt did not go to the ER. Pt's  is having heart surgery on Wednesday and was at the hospital for a pre-op visit, so pt did not want to go to the ER. Pt states she does not know what is causing the falls. Pt states she did not lose consciousness. 9/13/21 pt stated she has not followed up with her doctor (primary or cardiologist) following 8/30/21 PT session. Pt stated her Yue Schaefer is still kind of tight, but I'm doing better. \"  Pt stated she can reach higher during her HEP. Pt rated pain 2/10 and is located primarily at the R upper shoulder/R lateral neck; pointed toward R levator scapula. 9/15/21  Pt started to use straight cane for balance- \"its security\". Pt contacted her primary doctor \"but she hasn't gotten back yet. \" Pt did well following last PT session. Pain today is rated low, 1/10. Pt put on a spray \"Thoroughworks\" (sp?) which helps with pain.       OBJECTIVE:    Observation:   Jerry Palomo   8/18/21 26 raw/34.1% dysfunction; 9/15/21 17 raw, 13.6% dysfunction   Test measurements:      CERV ROM  8/18/21   9/15/21   Cervical Flexion 25   45   Cervical Extension 15, B mid cervical pain   30   Cervical SB L 20, R neck pain  R 12   L 20  R 12   Cervical rotation L 60  R 40   L 40  R 65            ROM Left Right  8/18/21 Right  9/15/21   Shoulder Flex 120 120  (PROM 140) 145   Shoulder Abd 110 100  (PROM 140) 135   Shoulder ER        Shoulder IR        IR behind back To T8 To T8 To T8            Strength  Left Right    Shoulder Flex 4/5 3-/5    Shoulder Scap 4/5 3-/5    Shoulder ER 5/5 4/5          RESTRICTIONS/PRECAUTIONS: OA, osteopenia, DM, THR, TKR, Parkinson's, rotator cuff repair     Exercises/Interventions:   Therapeutic Ex (78416)  Min: 10 Resistance/Repetitions CUES/Notes   Pulley flexion 30x    AAROM R shoulder  Flexion  Scaption  In sitting   Tband Row             Ext Yellow x20  Yellow x20           Manual Intervention  (36211)  Min: 15     STM R neck/shoulder STM R levator scap/supraspinatus Seated        NMR re-education (75236)  Min: 10     Roll ball on table  Flexion  Scaption   30x  30x    [place] 20x         Isometrics shoulder  Flexion  ABD  IR  ER   10x  10x  10x  10x         Vitals     HR 9/13/21 76  9/15/21 79      BP 9/13/21 148/61  9/15/21 129/61      Modalities:  Min                 Other Therapeutic Activities:  Pt was educated on PT POC, Diagnosis, Prognosis, pathomechanics as well as frequency and duration of scheduling future physical therapy appointments. Time was also taken on this day to answer all patient questions and participation in PT. Reviewed appointment policy in detail with patient and patient verbalized understanding. Home Exercise Program:   Access Code: [place] Gandhi: [email or web address]  Date: 08/18/2021  Prepared by: Yuly Kinsey     Exercises  Standing Shoulder Shrugs - 1 x daily - 7 x weekly - 10 reps - 5 hold  Standing Scapular Retraction - 1 x daily - 7 x weekly - 10 reps - 5 hold  Standing Shoulder Flexion AAROM with Dowel - 1 x daily - 7 x weekly - 10 reps  Seated Cervical Rotation AROM - 1 x daily - 7 x weekly - 10 reps  Standing Shoulder External and Internal Rotation AROM - 1 x daily - 7 x weekly - 10 reps       Therapeutic Exercise and NMR EXR  [] (76982) Provided verbal/tactile cueing for activities related to strengthening, flexibility, endurance, ROM  for improvements in scapular, scapulothoracic and UE control with self care, reaching, carrying, lifting, house/yardwork, driving/computer work.    [] (91225) Provided verbal/tactile cueing for activities related to improving balance, coordination, kinesthetic sense, posture, motor skill, proprioception  to assist with  scapular, scapulothoracic and UE control with self care, reaching, carrying, lifting, house/yardwork, driving/computer work. Therapeutic Activities:    [] (91179 or 30880) Provided verbal/tactile cueing for activities related to improving balance, coordination, kinesthetic sense, posture, motor skill, proprioception and motor activation to allow for proper function of scapular, scapulothoracic and UE control with self care, carrying, lifting, driving/computer work.      Home Exercise Program:    [x] (77133) Reviewed/Progressed HEP activities related to strengthening, flexibility, endurance, ROM of scapular, scapulothoracic and UE control with self care, reaching, carrying, lifting, house/yardwork, driving/computer work  [] (14605) Reviewed/Progressed HEP activities related to improving balance, coordination, kinesthetic sense, posture, motor skill, proprioception of scapular, scapulothoracic and UE control with self care, reaching, carrying, lifting, house/yardwork, driving/computer work      Manual Treatments:  PROM / STM / Oscillations-Mobs:  G-I, II, III, IV (PA's, Inf., Post.)  [] (65174) Provided manual therapy to mobilize soft tissue/joints of cervical/CT, scapular GHJ and UE for the purpose of modulating pain, promoting relaxation,  increasing ROM, reducing/eliminating soft tissue swelling/inflammation/restriction, improving soft tissue extensibility and allowing for proper ROM for normal function with self care, reaching, carrying, lifting, house/yardwork, driving/computer work    Charges:  Timed Code Treatment Minutes: 40   Total Treatment Minutes: 40       [] EVAL (LOW) 74933 (typically 20 minutes face-to-face)  [] EVAL (MOD) 11868 (typically 30 minutes face-to-face)  [] EVAL (HIGH) 07782 (typically 45 minutes face-to-face)  [] RE-EVAL     [x] IC(35737) x   1  [] Dry needle 1 or 2 Muscles (07547)  [x] NMR (83781) x  1   [] Dry needle 3+ Muscles (64132)  [x] Manual (80129) x    1 [] Ultrasound (04237) x  [] TA (99066) x     [] Mech Traction (68436)  [] ES(attended) (31168)     [] ES (un) (32729):   [] Vasopump (13544) [] Ionto (50425)   [] Other:      GOALS:  Patient stated goal: \"being able to do stuff again\"  []? Progressing: []? Met: []? Not Met: []? Adjusted     Therapist goals for Patient:   Short Term Goals: To be achieved in: 2 weeks  1. Independent in HEP and progression per patient tolerance, in order to prevent re-injury. []? Progressing: []? Met: []? Not Met: []? Adjusted  2. Patient will have a decrease in pain to facilitate improvement in movement, function, and ADLs as indicated by Functional Deficits. []? Progressing: []? Met: []? Not Met: []? Adjusted     Long Term Goals: To be achieved in: 8 weeks  1. Disability index score of 20% or less for the [employer] to assist with reaching prior level of function. []? Progressing: []? Met: []? Not Met: []? Adjusted  2. Patient will demonstrate increased AROM R shoulder ABD to 130 degrees allow for proper joint functioning as indicated by Functional Deficits. []? Progressing: []? Met: []? Not Met: []? Adjusted  3. Patient will demonstrate an increase in NM recruitment/activation and overall GH and scapular strength to within n5lbs HHD or WNL for proper functional mobility as indicated by patients Functional Deficits. []? Progressing: []? Met: []? Not Met: []? Adjusted  4. 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 []? Progressing: []? Met: []? Not Met: []? Adjusted  5. Patient will be able to clean her house. []? Progressing: []? Met: []? Not Met: []? Adjusted    ASSESSMENT:  Pt has tenderness to palpation in L UE first web space - soft tissue vs directly on bone. PT feels pt can wait until her next visit with UNA Martinez for recent fall. Cause of falls is unknown, and that is concerning to PT and pt. Pt has a cardiologist - PT suggested pt make an appointment.   Pt is more concerned about her  who is having aortic valve replaced on Wednesday. Treatment/Activity Tolerance:  [x] Patient tolerated treatment well [] Patient limited by fatique  [] Patient limited by pain  [] Patient limited by other medical complications  [] Other:      Overall Progression Towards Functional goals/ Treatment Progress Update:  [] Patient is progressing as expected towards functional goals listed. [] Progression is slowed due to complexities/Impairments listed. [] Progression has been slowed due to co-morbidities. [x] Plan just implemented, too soon to assess goals progression <30days   [] Goals require adjustment due to lack of progress  [] Patient is not progressing as expected and requires additional follow up with physician  [] Other    Prognosis for POC: [x] Good [] Fair  [] Poor    Patient requires continued skilled intervention: [x] Yes  [] No      PLAN: See eval  [x] Continue per plan of care [] Alter current plan (see comments)  [] Plan of care initiated [] Hold pending MD visit [] Discharge    Electronically signed by: Any Blandon PT     Note: If patient does not return for scheduled/recommended follow up visits, this note will serve as a discharge from care along with the most recent update on progress.

## 2021-10-15 ENCOUNTER — HOSPITAL ENCOUNTER (OUTPATIENT)
Dept: PHYSICAL THERAPY | Age: 76
Setting detail: THERAPIES SERIES
Discharge: HOME | End: 2021-10-15
Payer: MEDICARE

## 2021-10-15 PROCEDURE — 97110 THERAPEUTIC EXERCISES: CPT

## 2021-10-15 PROCEDURE — 97161 PT EVAL LOW COMPLEX 20 MIN: CPT

## 2021-10-15 NOTE — FLOWSHEET NOTE
[facility]  Outpatient Rehabilitation and Therapy, [address] [place] [address]  Phone: [phone]   Fax:     [phone]      Physical Therapy Treatment Note/ Progress Report:     Date:  10/15/2021    Patient Name:  Matthew Jalloh    :    MRN: [id number]    Pertinent Medical History:     Medical/Treatment Diagnosis Information:  · Diagnosis: Unsteady gait when walking (R26.81); Numbness and tingling of left leg (R20.0, R 20.2)  · Treatment Diagnosis: Decreased ADL status    Insurance/Certification information:  PT Insurance Information: [facility]  Physician Information:  Referring Practitioner: Dr. Vishnu Ceballos of care signed (Y/N): N    Date of Patient follow up with Physician:      Progress Report: []  Yes  [x]  No     Date Range for reporting period:  Beginning:  10/15/21  Ending:      Progress report due (10 Rx/or 30 days whichever is less):      Recertification due (POC duration/ or 90 days whichever is less):      Visit # POC/Insurance Allowable Auth Needed   1 16 []Yes   [x]No     Latex Allergy:  [x]NO      []YES  Preferred Language for Healthcare:   [x]English       []other:  Functional Scale: 10/15/21 WOMAC = 13/96,  SINGH/56,Tinetti: Balance 8, Gait 5, Total 13      Pain level:  1-2/10 L toes    History of Injury: Patient stated she has had 3 falls recently. Pt has fallen forward on flat concrete, one time her leg gave out in a parking lot, third time pt fell forward and L into bushes. Pt has not fallen backward or to the right. Pt notices when she walks she is \"veering towards the left. \"  Pt looses balance frequently \"where I catch myself\" by \"grabbing a hold of what is there\" in the home. Also, cane helps \"but its wobbly, my walker does better. .. but its hard to get it in the car. \"  Pt's rolling walker \"has a seat on it. \" Pt ambulated into the PT clinic with a cane.  Pt stated she owns a collapsible walker and demonstrated with a PT department rolling walker that she knew how to ambulate with it and collapse it. Pt had a brain scan performed which showed history of small strokes    SUBJECTIVE:  See eval    OBJECTIVE:   Observation:    10/15/21  Functional Mobility/Transfers: transfers with S     Gait: with cane: decreased step length B, decrease trunk rotation, occasionally dragged L foot  Toe walk: (with HHA) (+), unable to perform; able to perform 3 heel raises with HHA in standing  TUG: with cane 37 seconds; with rolling walker 30 seconds   Test measurements:      Strength  LEFT RIGHT   HIP Flexors 3+/5 4-/5   Knee EXT (quad) 4-/5 4+/5   Knee Flex (HS) 4+/5 4+/5   Ankle DF 4-/5 4+/5   Ankle PF 4-/5 4/5   Ankle Inv 4/5 4+/5   Ankle EV 4-/5 5/5         RESTRICTIONS/PRECAUTIONS:     Exercises/Interventions:     Therapeutic Ex (12562)   Min: Reps/Resistance Notes/CUES   Nu Step 5'    Sitting trunk t-band  Flexion  Rotation     Sitting BAPS                         Manual Intervention (65544)  Min:                                   NMR re-education (32519)  Min:  CUES NEEDED   Standing march     Standing ABD     LAQ     HS curl with t-band     SANDER          Therapeutic Activity (42788)  Min:     Airex  Static balance  Weight shift lateral  Weight shift     Side stepping     Standing rhythmic stabilization                    Modalities  Min:     IFC with      CP after exercises     MH after exercises            Other Therapeutic Activities: Pt was educated on PT POC, Diagnosis, Prognosis, pathomechanics as well as frequency and duration of scheduling future physical therapy appointments. Time was also taken on this day to answer all patient questions and participation in PT. Reviewed appointment policy in detail with patient and patient verbalized understanding. Home Exercise Program: Patient was instructed in the following for HEP:      Access Code: [email or web address]  URL: [email or web address]  Date: 10/15/2021  Prepared by: Max Clan     Exercises  Seated Heel Raise - 1 x daily - 7 x weekly - 10 reps  Seated Toe Raise - 1 x daily - 7 x weekly - 10 reps  Seated Ankle Circles - 1 x daily - 7 x weekly - 10 reps  Seated Long Arc Quad - 1 x daily - 7 x weekly - 10 reps  Seated March - 1 x daily - 7 x weekly - 10 reps  Seated Forward Bending - 1 x daily - 7 x weekly - 10 reps  Seated Trunk Rotation - Arms Crossed - 1 x daily - 7 x weekly - 10 reps       Therapeutic Exercise and NMR EXR  [] (54958) Provided verbal/tactile cueing for activities related to strengthening, flexibility, endurance, ROM for improvements in LE, proximal hip, and core control with self care, mobility, lifting, ambulation.  [] (97681) Provided verbal/tactile cueing for activities related to improving balance, coordination, kinesthetic sense, posture, motor skill, proprioception  to assist with LE, proximal hip, and core control in self care, mobility, lifting, ambulation and eccentric single leg control.      NMR and Therapeutic Activities:    [] (35128 or 66413) Provided verbal/tactile cueing for activities related to improving balance, coordination, kinesthetic sense, posture, motor skill, proprioception and motor activation to allow for proper function of core, proximal hip and LE with self care and ADLs and functional mobility.   [] (48581) Gait Re-education- Provided training and instruction to the patient for proper LE, core and proximal hip recruitment and positioning and eccentric body weight control with ambulation re-education including up and down stairs     Home Exercise Program:    [x] (16488) Reviewed/Progressed HEP activities related to strengthening, flexibility, endurance, ROM of core, proximal hip and LE for functional self-care, mobility, lifting and ambulation/stair navigation   [] (24502)Reviewed/Progressed HEP activities related to improving balance, coordination, kinesthetic sense, posture, motor skill, proprioception of core, proximal hip and LE for self care, mobility, lifting, and ambulation/stair navigation      Manual Treatments:  PROM / STM / Oscillations-Mobs:  G-I, II, III, IV (Franki, Inf., Post.)  [] (11076) Provided manual therapy to mobilize LE, proximal hip and/or LS spine soft tissue/joints for the purpose of modulating pain, promoting relaxation,  increasing ROM, reducing/eliminating soft tissue swelling/inflammation/restriction, improving soft tissue extensibility and allowing for proper ROM for normal function with self care, mobility, lifting and ambulation. Charges:  Timed Code Treatment Minutes: 25   Total Treatment Minutes: 45      [x] EVAL (LOW) 11495 (typically 20 minutes face-to-face)  [] EVAL (MOD) 54008 (typically 30 minutes face-to-face)  [] EVAL (HIGH) 43679 (typically 45 minutes face-to-face)  [] RE-EVAL     [x] UU(96885) x 2    [] Dry needle 1 or 2 Muscles (53898)  [] NMR (20680) x     [] Dry needle 3+ Muscles (39227)  [] Manual (93446) x     [] Ultrasound (47135) x  [] TA (38944) x     [] Mech Traction (79661)  [] ES(attended) (67901)     [] ES (un) (61253):   [] Vasopump (44663) [] Ionto (47914)   [] Other:    GOALS:    Patient stated goal: \"be able to walk without any help\"  []? Progressing: []? Met: []? Not Met: []? Adjusted     Therapist goals for Patient:   Short Term Goals: To be achieved in: 2 weeks  1. Independent in HEP and progression per patient tolerance, in order to prevent re-injury. []? Progressing: []? Met: []? Not Met: []? Adjusted  2. Patient will have a decrease in pain to facilitate improvement in movement, function, and ADLs as indicated by Functional Deficits. []? Progressing: []? Met: []? Not Met: []? Adjusted     Long Term Goals: To be achieved in: 8 weeks  1. Disability index score of 10% or less for the [facility] to assist with reaching prior level of function. []? Progressing: []? Met: []? Not Met: []? Adjusted  2.  Patient will demonstrate improvement in balance by scoring 36 or higher per Torres Balance Assessment. []? Progressing: []? Met: []? Not Met: []? Adjusted  3. Patient will demonstrate an increase in Strength to good proximal hip strength and control, in LE to allow for proper functional mobility as indicated by patients Functional Deficits. []? Progressing: []? Met: []? Not Met: []? Adjusted  4. Patient will return to walking within the home without reports of loss of balance. []? Progressing: []? Met: []? Not Met: []? Adjusted  5. Patient will be able to walk up and down stairs at home with improved confidence. []? Progressing: []? Met: []? Not Met: []? Adjusted     ASSESSMENT:  See eval    Treatment/Activity Tolerance:  [x] Patient tolerated treatment well [] Patient limited by fatique  [] Patient limited by pain  [] Patient limited by other medical complications  [] Other:     Overall Progression Towards Functional goals/ Treatment Progress Update:  [] Patient is progressing as expected towards functional goals listed. [] Progression is slowed due to complexities/Impairments listed. [] Progression has been slowed due to co-morbidities. [x] Plan just implemented, too soon to assess goals progression <30days   [] Goals require adjustment due to lack of progress  [] Patient is not progressing as expected and requires additional follow up with physician  [] Other    Prognosis for POC: [x] Good [] Fair  [] Poor    Patient requires continued skilled intervention: [x] Yes  [] No        PLAN:   [] Continue per plan of care [] Alter current plan (see comments)  [x] Plan of care initiated [] Hold pending MD visit [] Discharge    Electronically signed by: Lily Quintero PT , OMT-C, [id number]      Note: If patient does not return for scheduled/recommended follow up visits, this note will serve as a discharge from care along with the most recent update on progress.

## 2021-10-15 NOTE — PROGRESS NOTES
and L into bushes. Pt has not fallen backward or to the right. Pt notices when she walks she is \"veering towards the left. \"  Pt looses balance frequently \"where I catch myself\" by \"grabbing a hold of what is there\" in the home. Also, cane helps \"but its wobbly, my walker does better. .. but its hard to get it in the car. \"  Pt's rolling walker \"has a seat on it. \" Pt ambulated into the PT clinic with a cane. Pt stated she owns a collapsible walker and demonstrated with a PT department rolling walker that she knew how to ambulate with it and collapse it. Pt had a brain scan performed which showed history of small strokes    Relevant Medical History: see below  Functional Scale/Score: WOMAC = 13/96     Pain Scale: 1-2/10 plantar L foot \"under my toes\"    Type: []Constant   []Intermittent  []Radiating []Localized []other:     Numbness/Tingling: numbness of toes, L > R    Occupation/School: retired    Living Status/Prior Level of Function: Independent with ADLs and IADLs    OBJECTIVE:     Functional Mobility/Transfers: transfers with S    Gait: with cane: decreased step length B, decrease trunk rotation, occasionally dragged L foot  Toe walk: (with HHA) (+), unable to perform; able to perform 3 heel raises with HHA in standing  TUG: with cane 37 seconds; with rolling walker 30 seconds  SINGH/56  Tinetti: Balance 8, Gait 5, Total 13    ROM LEFT  Grossly WFL RIGHT  Grossly WFL   HIP Flex     HIP Abd     HIP Ext     HIP IR     HIP ER     Knee ext     Knee Flex     Ankle PF     Ankle DF     Ankle In     Ankle Ev     Strength  LEFT RIGHT   HIP Flexors 3+/5 4-/5   Knee EXT (quad) 4-/5 4+/5   Knee Flex (HS) 4+/5 4+/5   Ankle DF 4-/5 4+/5   Ankle PF 4-/5 4/5   Ankle Inv 4/5 4+/5   Ankle EV 4-/5 5/5     Reflexes/Sensation:    [x]Dermatomes/Myotomes intact    [x]Reflexes equal and normal bilaterally   []Other:                       [x] Patient history, allergies, meds reviewed. Medical chart reviewed. See intake form. Review Of Systems (ROS):  [x]Performed Review of systems (Integumentary, CardioPulmonary, Neurological) by intake and observation. Intake form has been scanned into medical record. Patient has been instructed to contact their primary care physician regarding ROS issues if not already being addressed at this time. Co-morbidities/Complexities (which will affect course of rehabilitation):   None              Arthritic conditions   []? Rheumatoid arthritis (M05.9)  [x]? Osteoarthritis (M19.91)    Cardiovascular conditions   []? Hypertension (I10)  [x]? Hyperlipidemia (E78.5)  []? Angina pectoris (I20)  []? Atherosclerosis (I70)  []? CVA Musculoskeletal conditions   []? Disc pathology   []? Congenital spine pathologies   []? Prior surgical intervention  []? Osteoporosis (M81.8)  [x]? Osteopenia (M85.8)   Endocrine conditions   []? Hypothyroid (E03.9)  []? Hyperthyroid Gastrointestinal conditions   []? Constipation (Q34.80)    Metabolic conditions   []? Morbid obesity (E66.01)  [x]? Diabetes type 1(E10.65) or 2 (E11.65)   []? Neuropathy (G60.9)      Pulmonary conditions   []? Asthma (J45)  []? Coughing   []? COPD (J44.9)    Psychological Disorders  []? Anxiety (F41.9)  []? Depression (F32.9)   []? Other:    [x]? Other:     CA  Anemia  GRACIA R 2014  TKA L 2014  Hand surgery  Shoulder surgery  Parkinsons         Barriers to/and or personal factors that will affect rehab potential:              []Age  []Sex    []Smoker              []Motivation/Lack of Motivation                        []Co-Morbidities              []Cognitive Function, education/learning barriers              []Environmental, home barriers              []profession/work barriers  []past PT/medical experience  []other:  Justification:     Falls Risk Assessment (30 days):  [x] Falls Risk assessed and no intervention required.   [] Falls Risk assessed and Patient requires intervention due to being higher risk   TUG score (>12s at risk):     [] Falls education provided, including ASSESSMENT:   Functional Impairments:     []Noted lumbar/proximal hip/LE hypomobility   []Decreased LE functional ROM   [x]Decreased core/proximal hip strength and neuromuscular control   [x]Decreased LE functional strength   [x]Reduced balance/proprioceptive control   []other:      Functional Activity Limitations (from functional questionnaire and intake)   []Reduced ability to tolerate prolonged functional positions   [x]Reduced ability or difficulty with changes of positions or transfers between positions   [x]Reduced ability to maintain good posture and demonstrate good body mechanics with sitting, bending, and lifting   [x]Reduced ability to sleep   [] Reduced ability or tolerance with driving and/or computer work   [x]Reduced ability to perform lifting, carrying tasks   [x]Reduced ability to squat   [x]Reduced ability to forward bend   []Reduced ability to ambulate prolonged functional periods/distances/surfaces   [x]Reduced ability to ascend/descend stairs   [x]Reduced ability to run, hop or jump   []other:     Participation Restrictions   [x]Reduced participation in self care activities   [x]Reduced participation in home management activities   []Reduced participation in work activities   [x]Reduced participation in social activities. []Reduced participation in sport activities. Classification :    []Signs/symptoms consistent with post-surgical status including decreased ROM, strength and function.    []Signs/symptoms consistent with joint sprain/strain   []Signs/symptoms consistent with patella-femoral syndrome   []Signs/symptoms consistent with knee OA/hip OA   []Signs/symptoms consistent with internal derangement of knee/Hip   []Signs/symptoms consistent with functional hip weakness/NMR control      []Signs/symptoms consistent with tendinitis/tendinosis    []signs/symptoms consistent with pathology which may benefit from Dry needling      [x]other: signs/symptoms consistent with balance dysfunction      Prognosis/Rehab Potential:      []Excellent   [x]Good    []Fair   []Poor    Tolerance of evaluation/treatment:    []Excellent   [x]Good    []Fair   []Poor    Physical Therapy Evaluation Complexity Justification  [x] A history of present problem with:  [] no personal factors and/or comorbidities that impact the plan of care;  []1-2 personal factors and/or comorbidities that impact the plan of care  [x]3 personal factors and/or comorbidities that impact the plan of care  [x] An examination of body systems using standardized tests and measures addressing any of the following: body structures and functions (impairments), activity limitations, and/or participation restrictions;:  [] a total of 1-2 or more elements   [] a total of 3 or more elements   [x] a total of 4 or more elements   [x] A clinical presentation with:  [x] stable and/or uncomplicated characteristics   [] evolving clinical presentation with changing characteristics  [] unstable and unpredictable characteristics;   [x] Clinical decision making of [] low, [] moderate, [] high complexity using standardized patient assessment instrument and/or measurable assessment of functional outcome. [x] EVAL (LOW) 77749 (typically 20 minutes face-to-face)  [] EVAL (MOD) 75054 (typically 30 minutes face-to-face)  [] EVAL (HIGH) 34280 (typically 45 minutes face-to-face)  [] RE-EVAL     PLAN:  Frequency/Duration:  1-2 days per week for 8 Weeks:  Interventions:  [x]  Therapeutic exercise including: strength training, ROM, for Lower extremity and core   [x]  NMR activation and proprioception for LE, Glutes and Core   [x]  Manual therapy as indicated for LE, Hip and spine to include: Dry Needling/IASTM, STM, PROM, Gr I-IV mobilizations, manipulation.    [x] Modalities as needed that may include: thermal agents, E-stim, Biofeedback, US, iontophoresis as indicated  [x] Patient education on joint protection, postural re-education, activity modification, Adjusted     Electronically signed by:  Margot Escalante PT      Note: If patient does not return for scheduled/recommended follow up visits, this note will serve as a discharge from care along with the most recent update on progress.

## 2021-10-18 ENCOUNTER — HOSPITAL ENCOUNTER (OUTPATIENT)
Dept: PHYSICAL THERAPY | Age: 76
Setting detail: THERAPIES SERIES
Discharge: HOME | End: 2021-10-18
Payer: MEDICARE

## 2021-10-18 PROCEDURE — 97530 THERAPEUTIC ACTIVITIES: CPT

## 2021-10-18 PROCEDURE — 97112 NEUROMUSCULAR REEDUCATION: CPT

## 2021-10-18 PROCEDURE — 97110 THERAPEUTIC EXERCISES: CPT

## 2021-10-18 NOTE — FLOWSHEET NOTE
[facility] – [place], [place]  Outpatient Rehabilitation and Therapy, [address] [place] [address]  Phone: [phone]   Fax:     [phone]      Physical Therapy Treatment Note/ Progress Report:     Date:  10/18/2021    Patient Name:  Ronda Arrington    :    MRN: [id number]    Pertinent Medical History:     Medical/Treatment Diagnosis Information:  · Diagnosis: Unsteady gait when walking (R26.81); Numbness and tingling of left leg (R20.0, R 20.2)  · Treatment Diagnosis: Decreased ADL status    Insurance/Certification information:  PT Insurance Information: [facility]  Physician Information:  Referring Practitioner: Dr. Valente Kirk of care signed (Y/N): N    Date of Patient follow up with Physician:      Progress Report: []  Yes  [x]  No     Date Range for reporting period:  Beginning:  10/15/21  Ending:      Progress report due (10 Rx/or 30 days whichever is less):      Recertification due (POC duration/ or 90 days whichever is less):      Visit # POC/Insurance Allowable Auth Needed   2 16 []Yes   [x]No     Latex Allergy:  [x]NO      []YES  Preferred Language for Healthcare:   [x]English       []other:  Functional Scale: 10/15/21 WOMAC = 13/96,  SINGH/56,Tinetti: Balance 8, Gait 5, Total 13      Pain level:  1-2/10 L toes    History of Injury: Patient stated she has had 3 falls recently. Pt has fallen forward on flat concrete, one time her leg gave out in a parking lot, third time pt fell forward and L into bushes. Pt has not fallen backward or to the right. Pt notices when she walks she is \"veering towards the left. \"  Pt looses balance frequently \"where I catch myself\" by \"grabbing a hold of what is there\" in the home. Also, cane helps \"but its wobbly, my walker does better. .. but its hard to get it in the car. \"  Pt's rolling walker \"has a seat on it. \" Pt ambulated into the PT clinic with a cane.  Pt stated she owns a collapsible walker and demonstrated with a PT department rolling walker that she knew how to ambulate with it and collapse it. Pt had a brain scan performed which showed history of small strokes    SUBJECTIVE:  See eval    OBJECTIVE:   Observation:    10/15/21  Functional Mobility/Transfers: transfers with S   Gait: with cane: decreased step length B, decrease trunk rotation, occasionally dragged L foot  Toe walk: (with HHA) (+), unable to perform; able to perform 3 heel raises with HHA in standing  TUG: with cane 37 seconds; with rolling walker 30 seconds   Test measurements:      Strength  LEFT RIGHT   HIP Flexors 3+/5 4-/5   Knee EXT (quad) 4-/5 4+/5   Knee Flex (HS) 4+/5 4+/5   Ankle DF 4-/5 4+/5   Ankle PF 4-/5 4/5   Ankle Inv 4/5 4+/5   Ankle EV 4-/5 5/5         RESTRICTIONS/PRECAUTIONS:     Exercises/Interventions:     Therapeutic Ex (31016)   Min: Reps/Resistance Notes/CUES   Nu Step 5'    Sitting trunk t-band  Flexion  Rotation   Orange 15x  Green 15x L/R    Sitting BAPS L/R  PF/DF  INV/EV  CW  CCW   20x  20x  15x  15x                        Manual Intervention (99000)  Min:                                   NMR re-education (78533)  Min:  CUES NEEDED   Standing march t-slide Yellow 10x    Standing ABD t-slide Yellow 10x    LAQ 2#, 2x10 L/R    HS curl with t-band Blue, 20x L/R    SANDER  Knee flexion  Knee extension Settings 8 and 9, 10x each         Therapeutic Activity (83256)  Min:     Airex  Static balance  Weight shift lateral  Weight shift   2'  20x  20x    Side stepping     Standing rhythmic stabilization 10x AP  10x Lateral    Walk up/down 4 steps 3x, SBA 2 rails             Modalities  Min:     IFC with      CP after exercises     MH after exercises            Other Therapeutic Activities: Pt was educated on PT POC, Diagnosis, Prognosis, pathomechanics as well as frequency and duration of scheduling future physical therapy appointments.  Time was also taken on this day to answer all patient questions and participation in PT. Reviewed appointment policy in detail with patient and patient verbalized understanding. Home Exercise Program: Patient was instructed in the following for HEP:      Access Code: [email or web address]  URL: [email or web address]  Date: 10/15/2021  Prepared by: Marge Brown     Exercises  Seated Heel Raise - 1 x daily - 7 x weekly - 10 reps  Seated Toe Raise - 1 x daily - 7 x weekly - 10 reps  Seated Ankle Circles - 1 x daily - 7 x weekly - 10 reps  Seated Long Arc Quad - 1 x daily - 7 x weekly - 10 reps  Seated March - 1 x daily - 7 x weekly - 10 reps  Seated Forward Bending - 1 x daily - 7 x weekly - 10 reps  Seated Trunk Rotation - Arms Crossed - 1 x daily - 7 x weekly - 10 reps       Therapeutic Exercise and NMR EXR  [] (78434) Provided verbal/tactile cueing for activities related to strengthening, flexibility, endurance, ROM for improvements in LE, proximal hip, and core control with self care, mobility, lifting, ambulation.  [] (34774) Provided verbal/tactile cueing for activities related to improving balance, coordination, kinesthetic sense, posture, motor skill, proprioception  to assist with LE, proximal hip, and core control in self care, mobility, lifting, ambulation and eccentric single leg control.      NMR and Therapeutic Activities:    [] (85916 or 13644) Provided verbal/tactile cueing for activities related to improving balance, coordination, kinesthetic sense, posture, motor skill, proprioception and motor activation to allow for proper function of core, proximal hip and LE with self care and ADLs and functional mobility.   [] (03433) Gait Re-education- Provided training and instruction to the patient for proper LE, core and proximal hip recruitment and positioning and eccentric body weight control with ambulation re-education including up and down stairs     Home Exercise Program:    [x] (40216) Reviewed/Progressed HEP activities related to strengthening, flexibility, endurance, ROM of core, proximal hip and LE for functional self-care, mobility, lifting and ambulation/stair navigation   [] (90293)Reviewed/Progressed HEP activities related to improving balance, coordination, kinesthetic sense, posture, motor skill, proprioception of core, proximal hip and LE for self care, mobility, lifting, and ambulation/stair navigation      Manual Treatments:  PROM / STM / Oscillations-Mobs:  G-I, II, III, IV (PA's, Inf., Post.)  [] (41557) Provided manual therapy to mobilize LE, proximal hip and/or LS spine soft tissue/joints for the purpose of modulating pain, promoting relaxation,  increasing ROM, reducing/eliminating soft tissue swelling/inflammation/restriction, improving soft tissue extensibility and allowing for proper ROM for normal function with self care, mobility, lifting and ambulation. Charges:  Timed Code Treatment Minutes: 45   Total Treatment Minutes: 45      [] EVAL (LOW) 64213 (typically 20 minutes face-to-face)  [] EVAL (MOD) 25903 (typically 30 minutes face-to-face)  [] EVAL (HIGH) 50551 (typically 45 minutes face-to-face)  [] RE-EVAL     [x] VK(28776) x 2    [] Dry needle 1 or 2 Muscles (08209)  [x] NMR (98039) x     [] Dry needle 3+ Muscles (62009)  [] Manual (21228) x     [] Ultrasound (42408) x  [x] TA (38418) x     [] Mech Traction (57262)  [] ES(attended) (91700)     [] ES (un) (45618):   [] Vasopump (89456) [] Ionto (63274)   [] Other:    GOALS:    Patient stated goal: \"be able to walk without any help\"  []? Progressing: []? Met: []? Not Met: []? Adjusted     Therapist goals for Patient:   Short Term Goals: To be achieved in: 2 weeks  1. Independent in HEP and progression per patient tolerance, in order to prevent re-injury. []? Progressing: []? Met: []? Not Met: []? Adjusted  2. Patient will have a decrease in pain to facilitate improvement in movement, function, and ADLs as indicated by Functional Deficits. []? Progressing: []? Met: []? Not Met: []?  Adjusted     Long Term Goals: To be achieved in: 8 weeks  1. Disability index score of 10% or less for the [facility] to assist with reaching prior level of function. []? Progressing: []? Met: []? Not Met: []? Adjusted  2. Patient will demonstrate improvement in balance by scoring 36 or higher per [facility] Assessment. []? Progressing: []? Met: []? Not Met: []? Adjusted  3. Patient will demonstrate an increase in Strength to good proximal hip strength and control, in LE to allow for proper functional mobility as indicated by patients Functional Deficits. []? Progressing: []? Met: []? Not Met: []? Adjusted  4. Patient will return to walking within the home without reports of loss of balance. []? Progressing: []? Met: []? Not Met: []? Adjusted  5. Patient will be able to walk up and down stairs at home with improved confidence. []? Progressing: []? Met: []? Not Met: []? Adjusted     ASSESSMENT:  See eval    Treatment/Activity Tolerance:  [x] Patient tolerated treatment well [] Patient limited by fatique  [] Patient limited by pain  [] Patient limited by other medical complications  [] Other:     Overall Progression Towards Functional goals/ Treatment Progress Update:  [] Patient is progressing as expected towards functional goals listed. [] Progression is slowed due to complexities/Impairments listed. [] Progression has been slowed due to co-morbidities.   [x] Plan just implemented, too soon to assess goals progression <30days   [] Goals require adjustment due to lack of progress  [] Patient is not progressing as expected and requires additional follow up with physician  [] Other    Prognosis for POC: [x] Good [] Fair  [] Poor    Patient requires continued skilled intervention: [x] Yes  [] No        PLAN:   [] Continue per plan of care [] Alter current plan (see comments)  [x] Plan of care initiated [] Hold pending MD visit [] Discharge    Electronically signed by: Cody Peoples PT , OMT-C, [id number]      Note: If patient does not return for scheduled/recommended follow up visits, this note will serve as a discharge from care along with the most recent update on progress.

## 2021-10-21 ENCOUNTER — HOSPITAL ENCOUNTER (OUTPATIENT)
Dept: PHYSICAL THERAPY | Age: 76
Setting detail: THERAPIES SERIES
Discharge: HOME | End: 2021-10-21
Payer: MEDICARE

## 2021-10-21 PROCEDURE — 97530 THERAPEUTIC ACTIVITIES: CPT

## 2021-10-21 PROCEDURE — 97110 THERAPEUTIC EXERCISES: CPT

## 2021-10-21 PROCEDURE — 97112 NEUROMUSCULAR REEDUCATION: CPT

## 2021-10-21 NOTE — FLOWSHEET NOTE
[facility]  Outpatient Rehabilitation and Therapy, [address] [place] [address]  Phone: [phone]   Fax:     [phone]      Physical Therapy Treatment Note/ Progress Report:     Date:  10/21/2021    Patient Name:  Noreen Bloom    :    MRN: [id number]    Pertinent Medical History:     Medical/Treatment Diagnosis Information:  · Diagnosis: Unsteady gait when walking (R26.81); Numbness and tingling of left leg (R20.0, R 20.2)  · Treatment Diagnosis: Decreased ADL status    Insurance/Certification information:  PT Insurance Information: [facility]  Physician Information:  Referring Practitioner: Dr. Daquan Marie of care signed (Y/N): N    Date of Patient follow up with Physician:      Progress Report: []  Yes  [x]  No     Date Range for reporting period:  Beginning:  10/15/21  Ending:      Progress report due (10 Rx/or 30 days whichever is less):      Recertification due (POC duration/ or 90 days whichever is less):      Visit # POC/Insurance Allowable Auth Needed   3 16 []Yes   [x]No     Latex Allergy:  [x]NO      []YES  Preferred Language for Healthcare:   [x]English       []other:  Functional Scale: 10/15/21 WOMAC = 13/96,  SINGH/56,Tinetti: Balance 8, Gait 5, Total 13      Pain level:  1-2/10 L toes    History of Injury: Patient stated she has had 3 falls recently. Pt has fallen forward on flat concrete, one time her leg gave out in a parking lot, third time pt fell forward and L into bushes. Pt has not fallen backward or to the right. Pt notices when she walks she is \"veering towards the left. \"  Pt looses balance frequently \"where I catch myself\" by \"grabbing a hold of what is there\" in the home. Also, cane helps \"but its wobbly, my walker does better. .. but its hard to get it in the car. \"  Pt's rolling walker \"has a seat on it. \" Pt ambulated into the PT clinic with a cane.  Pt stated she owns a collapsible walker and demonstrated with a PT department rolling walker that she knew how to ambulate with it and collapse it. Pt had a brain scan performed which showed history of small strokes    SUBJECTIVE:  See eval    OBJECTIVE:   Observation:    10/15/21  Functional Mobility/Transfers: transfers with S   Gait: with cane: decreased step length B, decrease trunk rotation, occasionally dragged L foot  Toe walk: (with HHA) (+), unable to perform; able to perform 3 heel raises with HHA in standing  TUG: with cane 37 seconds; with rolling walker 30 seconds   Test measurements:      Strength  LEFT RIGHT   HIP Flexors 3+/5 4-/5   Knee EXT (quad) 4-/5 4+/5   Knee Flex (HS) 4+/5 4+/5   Ankle DF 4-/5 4+/5   Ankle PF 4-/5 4/5   Ankle Inv 4/5 4+/5   Ankle EV 4-/5 5/5         RESTRICTIONS/PRECAUTIONS:     Exercises/Interventions:     Therapeutic Ex (66276)   Min: Reps/Resistance Notes/CUES   Nu Step 5'    Sitting trunk t-band  Flexion  Rotation   Orange 15x  Green 15x L/R    Standing BAPS L/R  PF/DF  INV/EV  CW  CCW   30x  20x  15x  15x With S assist                       Manual Intervention (28076)  Min:                                   NMR re-education (19602)  Min:  CUES NEEDED   Standing march t-slide Yellow 10x    Standing ABD t-slide Yellow 10x    LAQ 2#, 2x10 L/R    HS curl with t-band Blue, 20x L/R    SANDER  Knee flexion  Knee extension Settings 8 and 9, 10x each         Therapeutic Activity (50454)  Min:     Airex  Static balance  Weight shift lateral  Weight shift   2'  20x  20x    Side stepping     Standing rhythmic stabilization on Airex 15x AP  15x Lateral    Walk up/down 4 steps 5x, S assist 2 rails             Modalities  Min:     IFC with      CP after exercises     MH after exercises            Other Therapeutic Activities: Pt was educated on PT POC, Diagnosis, Prognosis, pathomechanics as well as frequency and duration of scheduling future physical therapy appointments.  Time was also taken on this day to answer all patient questions and participation in PT. Reviewed appointment policy in detail with patient and patient verbalized understanding. Home Exercise Program: Patient was instructed in the following for HEP:      Access Code: [email or web address]  URL: [email or web address]  Date: 10/15/2021  Prepared by: Jasiel Nelson     Exercises  Seated Heel Raise - 1 x daily - 7 x weekly - 10 reps  Seated Toe Raise - 1 x daily - 7 x weekly - 10 reps  Seated Ankle Circles - 1 x daily - 7 x weekly - 10 reps  Seated Long Arc Quad - 1 x daily - 7 x weekly - 10 reps  Seated March - 1 x daily - 7 x weekly - 10 reps  Seated Forward Bending - 1 x daily - 7 x weekly - 10 reps  Seated Trunk Rotation - Arms Crossed - 1 x daily - 7 x weekly - 10 reps       Therapeutic Exercise and NMR EXR  [] (27837) Provided verbal/tactile cueing for activities related to strengthening, flexibility, endurance, ROM for improvements in LE, proximal hip, and core control with self care, mobility, lifting, ambulation.  [] (57361) Provided verbal/tactile cueing for activities related to improving balance, coordination, kinesthetic sense, posture, motor skill, proprioception  to assist with LE, proximal hip, and core control in self care, mobility, lifting, ambulation and eccentric single leg control.      NMR and Therapeutic Activities:    [] (51055 or 98145) Provided verbal/tactile cueing for activities related to improving balance, coordination, kinesthetic sense, posture, motor skill, proprioception and motor activation to allow for proper function of core, proximal hip and LE with self care and ADLs and functional mobility.   [] (46736) Gait Re-education- Provided training and instruction to the patient for proper LE, core and proximal hip recruitment and positioning and eccentric body weight control with ambulation re-education including up and down stairs     Home Exercise Program:    [x] (08048) Reviewed/Progressed HEP activities related to strengthening, flexibility, endurance, ROM of core, proximal hip and LE for functional self-care, mobility, lifting and ambulation/stair navigation   [] (49027)Reviewed/Progressed HEP activities related to improving balance, coordination, kinesthetic sense, posture, motor skill, proprioception of core, proximal hip and LE for self care, mobility, lifting, and ambulation/stair navigation      Manual Treatments:  PROM / STM / Oscillations-Mobs:  G-I, II, III, IV (PA's, Inf., Post.)  [] (96495) Provided manual therapy to mobilize LE, proximal hip and/or LS spine soft tissue/joints for the purpose of modulating pain, promoting relaxation,  increasing ROM, reducing/eliminating soft tissue swelling/inflammation/restriction, improving soft tissue extensibility and allowing for proper ROM for normal function with self care, mobility, lifting and ambulation. Charges:  Timed Code Treatment Minutes: 45   Total Treatment Minutes: 45      [] EVAL (LOW) 44454 (typically 20 minutes face-to-face)  [] EVAL (MOD) 48098 (typically 30 minutes face-to-face)  [] EVAL (HIGH) 44105 (typically 45 minutes face-to-face)  [] RE-EVAL     [x] HC(74138) x    [] Dry needle 1 or 2 Muscles (53685)  [x] NMR (53348) x     [] Dry needle 3+ Muscles (75601)  [] Manual (45242) x     [] Ultrasound (28763) x  [x] TA (40599) x     [] Mech Traction (02294)  [] ES(attended) (67835)     [] ES (un) (29604):   [] Vasopump (96850) [] Ionto (56443)   [] Other:    GOALS:    Patient stated goal: \"be able to walk without any help\"  []? Progressing: []? Met: []? Not Met: []? Adjusted     Therapist goals for Patient:   Short Term Goals: To be achieved in: 2 weeks  1. Independent in HEP and progression per patient tolerance, in order to prevent re-injury. []? Progressing: []? Met: []? Not Met: []? Adjusted  2. Patient will have a decrease in pain to facilitate improvement in movement, function, and ADLs as indicated by Functional Deficits. []? Progressing: []? Met: []?  Not Met: []? Adjusted     Long Term Goals: To be achieved in: 8 weeks  1. Disability index score of 10% or less for the [facility] to assist with reaching prior level of function. []? Progressing: []? Met: []? Not Met: []? Adjusted  2. Patient will demonstrate improvement in balance by scoring 36 or higher per [facility] Assessment. []? Progressing: []? Met: []? Not Met: []? Adjusted  3. Patient will demonstrate an increase in Strength to good proximal hip strength and control, in LE to allow for proper functional mobility as indicated by patients Functional Deficits. []? Progressing: []? Met: []? Not Met: []? Adjusted  4. Patient will return to walking within the home without reports of loss of balance. []? Progressing: []? Met: []? Not Met: []? Adjusted  5. Patient will be able to walk up and down stairs at home with improved confidence. []? Progressing: []? Met: []? Not Met: []? Adjusted     ASSESSMENT:  See eval    Treatment/Activity Tolerance:  [x] Patient tolerated treatment well [] Patient limited by fatique  [] Patient limited by pain  [] Patient limited by other medical complications  [] Other:     Overall Progression Towards Functional goals/ Treatment Progress Update:  [] Patient is progressing as expected towards functional goals listed. [] Progression is slowed due to complexities/Impairments listed. [] Progression has been slowed due to co-morbidities.   [x] Plan just implemented, too soon to assess goals progression <30days   [] Goals require adjustment due to lack of progress  [] Patient is not progressing as expected and requires additional follow up with physician  [] Other    Prognosis for POC: [x] Good [] Fair  [] Poor    Patient requires continued skilled intervention: [x] Yes  [] No        PLAN:   [] Continue per plan of care [] Alter current plan (see comments)  [x] Plan of care initiated [] Hold pending MD visit [] Discharge    Electronically signed by: Loraine Willett, PT , OMT-C, [id number]      Note: If patient does not return for scheduled/recommended follow up visits, this note will serve as a discharge from care along with the most recent update on progress.

## 2021-10-26 ENCOUNTER — HOSPITAL ENCOUNTER (OUTPATIENT)
Dept: PHYSICAL THERAPY | Age: 76
Setting detail: THERAPIES SERIES
Discharge: HOME | End: 2021-10-26
Payer: MEDICARE

## 2021-10-26 PROCEDURE — 97110 THERAPEUTIC EXERCISES: CPT

## 2021-10-26 PROCEDURE — 97530 THERAPEUTIC ACTIVITIES: CPT

## 2021-10-26 PROCEDURE — 97112 NEUROMUSCULAR REEDUCATION: CPT

## 2021-10-26 NOTE — FLOWSHEET NOTE
[facility]  Outpatient Rehabilitation and Therapy, [address] [address]  Phone: [phone]   Fax:     [phone]      Physical Therapy Treatment Note/ Progress Report:     Date:  10/26/2021    Patient Name:  Gennaro Hearn    :    MRN: [id number]    Pertinent Medical History:     Medical/Treatment Diagnosis Information:  · Diagnosis: Unsteady gait when walking (R26.81); Numbness and tingling of left leg (R20.0, R 20.2)  · Treatment Diagnosis: Decreased ADL status    Insurance/Certification information:  PT Insurance Information: [facility]  Physician Information:  Referring Practitioner: Dr. Sloan Benson of care signed (Y/N): N    Date of Patient follow up with Physician:      Progress Report: []  Yes  [x]  No     Date Range for reporting period:  Beginning:  10/15/21  Ending:      Progress report due (10 Rx/or 30 days whichever is less):      Recertification due (POC duration/ or 90 days whichever is less):      Visit # POC/Insurance Allowable Auth Needed   4 16 []Yes   [x]No     Latex Allergy:  [x]NO      []YES  Preferred Language for Healthcare:   [x]English       []other:  Functional Scale: 10/15/21 WOMAC =     SINGH/56  Tinetti: Balance 8, Gait 5, Total 13      Pain level:  1-2/10 L toes    History of Injury: Patient stated she has had 3 falls recently. Pt has fallen forward on flat concrete, one time her leg gave out in a parking lot, third time pt fell forward and L into bushes. Pt has not fallen backward or to the right. Pt notices when she walks she is \"veering towards the left. \"  Pt looses balance frequently \"where I catch myself\" by \"grabbing a hold of what is there\" in the home. Also, cane helps \"but its wobbly, my walker does better. .. but its hard to get it in the car. \"  Pt's rolling walker \"has a seat on it. \" Pt ambulated into the PT clinic with a cane.  Pt stated she owns a collapsible walker and demonstrated with a PT department rolling walker that she knew how to ambulate with it and collapse it.    Pt had a brain scan performed which showed history of small strokes    SUBJECTIVE:  See eval  10/26/21 pt stated is \"paranoid I'm going to fall\"    OBJECTIVE:   Observation:    10/15/21  Functional Mobility/Transfers: transfers with S   Gait: with cane: decreased step length B, decrease trunk rotation, occasionally dragged L foot  Toe walk: (with HHA) (+), unable to perform; able to perform 3 heel raises with HHA in standing  TUG: with cane 37 seconds; with rolling walker 30 seconds   Test measurements:      Strength  LEFT  10/15/21 RIGHT  10/15/21   HIP Flexors 3+/5 4-/5   Knee EXT (quad) 4-/5 4+/5   Knee Flex (HS) 4+/5 4+/5   Ankle DF 4-/5 4+/5   Ankle PF 4-/5 4/5   Ankle Inv 4/5 4+/5   Ankle EV 4-/5 5/5         RESTRICTIONS/PRECAUTIONS:     Exercises/Interventions:     Therapeutic Ex (64587)   Min: Reps/Resistance Notes/CUES   Nu Step 5'    Sitting trunk t-band  Flexion  Rotation   Orange 15x  Green 15x L/R    Standing BAPS L/R  PF/DF  INV/EV  CW  CCW   30x  20x  15x  15x With S assist                       Manual Intervention (85115)  Min:                                   NMR re-education (85731)  Min:  CUES NEEDED   Standing march t-slide Yellow 10x    Standing ABD t-slide Yellow 10x              SANDER  Knee flexion  Knee extension Settings 8 and 9, 10x each    Concentric B LE simulaneous  Quads  Hamstrings   10 kg, 2x10 (setting 8)  10 kg, 2x10 (setting 9)    Therapeutic Activity (70208)  Min:     Airex  Static balance  Weight shift lateral  Weight shift   2'  20x  20x    Side stepping  Backward walking 10', 4x, L/R, HHA  10', 4x, HHA    Standing rhythmic stabilization on Airex 15x AP  15x Lateral    Walk up/down 4 steps 5x, S assist 2 rails             Modalities  Min:     IFC with      CP after exercises     MH after exercises            Other Therapeutic Activities: Pt was educated on PT POC, Diagnosis, Prognosis, pathomechanics as well as frequency and duration of scheduling future physical therapy appointments. Time was also taken on this day to answer all patient questions and participation in PT. Reviewed appointment policy in detail with patient and patient verbalized understanding. Home Exercise Program: Patient was instructed in the following for HEP:      Access Code: [email or web address]  URL: [email or web address]  Date: 10/15/2021  Prepared by: Jaclyn Duran  Seated Heel Raise - 1 x daily - 7 x weekly - 10 reps  Seated Toe Raise - 1 x daily - 7 x weekly - 10 reps  Seated Ankle Circles - 1 x daily - 7 x weekly - 10 reps  Seated Long Arc Quad - 1 x daily - 7 x weekly - 10 reps  Seated March - 1 x daily - 7 x weekly - 10 reps  Seated Forward Bending - 1 x daily - 7 x weekly - 10 reps  Seated Trunk Rotation - Arms Crossed - 1 x daily - 7 x weekly - 10 reps       Therapeutic Exercise and NMR EXR  [] (85293) Provided verbal/tactile cueing for activities related to strengthening, flexibility, endurance, ROM for improvements in LE, proximal hip, and core control with self care, mobility, lifting, ambulation.  [] (53374) Provided verbal/tactile cueing for activities related to improving balance, coordination, kinesthetic sense, posture, motor skill, proprioception  to assist with LE, proximal hip, and core control in self care, mobility, lifting, ambulation and eccentric single leg control.      NMR and Therapeutic Activities:    [] (95834 or 15640) Provided verbal/tactile cueing for activities related to improving balance, coordination, kinesthetic sense, posture, motor skill, proprioception and motor activation to allow for proper function of core, proximal hip and LE with self care and ADLs and functional mobility.   [] (40794) Gait Re-education- Provided training and instruction to the patient for proper LE, core and proximal hip recruitment and positioning and eccentric body weight Patient will have a decrease in pain to facilitate improvement in movement, function, and ADLs as indicated by Functional Deficits. []? Progressing: []? Met: []? Not Met: []? Adjusted     Long Term Goals: To be achieved in: 8 weeks  1. Disability index score of 10% or less for the [facility] to assist with reaching prior level of function. []? Progressing: []? Met: []? Not Met: []? Adjusted  2. Patient will demonstrate improvement in balance by scoring 36 or higher per [facility] Assessment. []? Progressing: []? Met: []? Not Met: []? Adjusted  3. Patient will demonstrate an increase in Strength to good proximal hip strength and control, in LE to allow for proper functional mobility as indicated by patients Functional Deficits. []? Progressing: []? Met: []? Not Met: []? Adjusted  4. Patient will return to walking within the home without reports of loss of balance. []? Progressing: []? Met: []? Not Met: []? Adjusted  5. Patient will be able to walk up and down stairs at home with improved confidence. []? Progressing: []? Met: []? Not Met: []? Adjusted     ASSESSMENT:  See eval    Treatment/Activity Tolerance:  [x] Patient tolerated treatment well [] Patient limited by fatique  [] Patient limited by pain  [] Patient limited by other medical complications  [] Other:     Overall Progression Towards Functional goals/ Treatment Progress Update:  [] Patient is progressing as expected towards functional goals listed. [] Progression is slowed due to complexities/Impairments listed. [] Progression has been slowed due to co-morbidities.   [x] Plan just implemented, too soon to assess goals progression <30days   [] Goals require adjustment due to lack of progress  [] Patient is not progressing as expected and requires additional follow up with physician  [] Other    Prognosis for POC: [x] Good [] Fair  [] Poor    Patient requires continued skilled intervention: [x] Yes  [] No        PLAN:   [] Continue per plan of

## 2021-10-28 ENCOUNTER — APPOINTMENT (OUTPATIENT)
Dept: PHYSICAL THERAPY | Age: 76
End: 2021-10-28
Payer: MEDICARE

## 2021-10-29 ENCOUNTER — APPOINTMENT (OUTPATIENT)
Dept: PHYSICAL THERAPY | Age: 76
End: 2021-10-29
Payer: MEDICARE

## 2021-11-02 ENCOUNTER — HOSPITAL ENCOUNTER (OUTPATIENT)
Dept: PHYSICAL THERAPY | Age: 76
Setting detail: THERAPIES SERIES
Discharge: HOME | End: 2021-11-02
Payer: MEDICARE

## 2021-11-02 PROCEDURE — 97112 NEUROMUSCULAR REEDUCATION: CPT

## 2021-11-02 PROCEDURE — 97110 THERAPEUTIC EXERCISES: CPT

## 2021-11-02 PROCEDURE — 97530 THERAPEUTIC ACTIVITIES: CPT

## 2021-11-02 NOTE — FLOWSHEET NOTE
[facility]  Outpatient Rehabilitation and Therapy, [address] [address]  Phone: [phone]   Fax:     [phone]      Physical Therapy Treatment Note/ Progress Report:     Date:  2021    Patient Name:  Gennaro Hearn    :    MRN: [id number]    Pertinent Medical History:     Medical/Treatment Diagnosis Information:  · Diagnosis: Unsteady gait when walking (R26.81); Numbness and tingling of left leg (R20.0, R 20.2)  · Treatment Diagnosis: Decreased ADL status    Insurance/Certification information:  PT Insurance Information: [facility]  Physician Information:  Referring Practitioner: Dr. Sloan Benson of care signed (Y/N): N    Date of Patient follow up with Physician:      Progress Report: []  Yes  [x]  No     Date Range for reporting period:  Beginning:  10/15/21  Ending:      Progress report due (10 Rx/or 30 days whichever is less):      Recertification due (POC duration/ or 90 days whichever is less):      Visit # POC/Insurance Allowable Auth Needed   5 16 []Yes   [x]No     Latex Allergy:  [x]NO      []YES  Preferred Language for Healthcare:   [x]English       []other:  Functional Scale: 10/15/21 WOMAC = 13/96    SINGH/56  Tinetti: Balance 8, Gait 5, Total 13      Pain level:  1-2/10 L toes    History of Injury: Patient stated she has had 3 falls recently. Pt has fallen forward on flat concrete, one time her leg gave out in a parking lot, third time pt fell forward and L into bushes. Pt has not fallen backward or to the right. Pt notices when she walks she is \"veering towards the left. \"  Pt looses balance frequently \"where I catch myself\" by \"grabbing a hold of what is there\" in the home. Also, cane helps \"but its wobbly, my walker does better. .. but its hard to get it in the car. \"  Pt's rolling walker \"has a seat on it. \" Pt ambulated into the PT clinic with a cane.  Pt stated she owns a collapsible walker and demonstrated with a PT department rolling walker that she knew how to ambulate with it and collapse it.    Pt had a brain scan performed which showed history of small strokes    SUBJECTIVE:  See eval  10/26/21 pt stated is \"paranoid I'm going to fall\"  11/2/21 pt stated her body is tired after putting on swimming pool cover, which took 3 hours to perform    OBJECTIVE:   Observation:    10/15/21  Functional Mobility/Transfers: transfers with S   Gait: with cane: decreased step length B, decrease trunk rotation, occasionally dragged L foot  Toe walk: (with HHA) (+), unable to perform; able to perform 3 heel raises with HHA in standing  TUG: with cane 37 seconds; with rolling walker 30 seconds   11/2/21  TUG: with rolling walker 34 seconds     Test measurements:      Strength  LEFT  10/15/21 RIGHT  10/15/21 Left  11/2/21 Right  11/2/21   HIP Flexors 3+/5 4-/5 4-/5 4-/5   Knee EXT (quad) 4-/5 4+/5 4-/5 4+/5   Knee Flex (HS) 4+/5 4+/5 5/5 5/5   Ankle DF 4-/5 4+/5 4+/5 4+/5   Ankle PF 4-/5 4/5 4/5 4+/5   Ankle Inv 4/5 4+/5 4-/5 4+/5   Ankle EV 4-/5 5/5 3+/5 4/5         RESTRICTIONS/PRECAUTIONS:     Exercises/Interventions:     Therapeutic Ex (51053)   Min: 15 Reps/Resistance Notes/CUES   Nu Step 5'    Sitting trunk t-band  Flexion  Rotation   Orange 15x  Green 15x L/R    Standing BAPS L/R  PF/DF  INV/EV  CW  CCW   15x  15x  15x  15x With S assist                       Manual Intervention (21945)  Min:                                   NMR re-education (29306)  Min: 15  CUES NEEDED   Standing march t-slide Yellow 10x    Standing ABD t-slide Yellow 10x              SANDER  Knee flexion  Knee extension Settings 8 and 9, 10x each    Concentric B LE simulaneous  Quads  Hamstrings   10 kg, 2x10 (setting 8)  10 kg, 2x10 (setting 9)    Therapeutic Activity (92463)  Min: 15     Airex  Static balance  Weight shift lateral  Weight shift   2'  20x  20x    Side stepping  Backward walking 10', 4x, L/R, HHA  10', 4x, HHA Standing rhythmic stabilization on Airex 15x AP  15x Lateral    Walk up/down 4 steps 5x, S assist 2 rails             Modalities  Min:     IFC with      CP after exercises     MH after exercises            Other Therapeutic Activities: Pt was educated on PT POC, Diagnosis, Prognosis, pathomechanics as well as frequency and duration of scheduling future physical therapy appointments. Time was also taken on this day to answer all patient questions and participation in PT. Reviewed appointment policy in detail with patient and patient verbalized understanding. Home Exercise Program: Patient was instructed in the following for HEP:      Access Code: [email or web address]  URL: [email or web address]  Date: 10/15/2021  Prepared by: Anson Tomlinson     Exercises  Seated Heel Raise - 1 x daily - 7 x weekly - 10 reps  Seated Toe Raise - 1 x daily - 7 x weekly - 10 reps  Seated Ankle Circles - 1 x daily - 7 x weekly - 10 reps  Seated Long Arc Quad - 1 x daily - 7 x weekly - 10 reps  Seated March - 1 x daily - 7 x weekly - 10 reps  Seated Forward Bending - 1 x daily - 7 x weekly - 10 reps  Seated Trunk Rotation - Arms Crossed - 1 x daily - 7 x weekly - 10 reps       Therapeutic Exercise and NMR EXR  [] (18716) Provided verbal/tactile cueing for activities related to strengthening, flexibility, endurance, ROM for improvements in LE, proximal hip, and core control with self care, mobility, lifting, ambulation.  [] (29726) Provided verbal/tactile cueing for activities related to improving balance, coordination, kinesthetic sense, posture, motor skill, proprioception  to assist with LE, proximal hip, and core control in self care, mobility, lifting, ambulation and eccentric single leg control.      NMR and Therapeutic Activities:    [] (38160 or 70781) Provided verbal/tactile cueing for activities related to improving balance, coordination, kinesthetic sense, posture, motor skill, proprioception and motor activation to allow for proper function of core, proximal hip and LE with self care and ADLs and functional mobility.   [] (51878) Gait Re-education- Provided training and instruction to the patient for proper LE, core and proximal hip recruitment and positioning and eccentric body weight control with ambulation re-education including up and down stairs     Home Exercise Program:    [x] (61692) Reviewed/Progressed HEP activities related to strengthening, flexibility, endurance, ROM of core, proximal hip and LE for functional self-care, mobility, lifting and ambulation/stair navigation   [] (31339)Reviewed/Progressed HEP activities related to improving balance, coordination, kinesthetic sense, posture, motor skill, proprioception of core, proximal hip and LE for self care, mobility, lifting, and ambulation/stair navigation      Manual Treatments:  PROM / STM / Oscillations-Mobs:  G-I, II, III, IV (PA's, Inf., Post.)  [] (05787) Provided manual therapy to mobilize LE, proximal hip and/or LS spine soft tissue/joints for the purpose of modulating pain, promoting relaxation,  increasing ROM, reducing/eliminating soft tissue swelling/inflammation/restriction, improving soft tissue extensibility and allowing for proper ROM for normal function with self care, mobility, lifting and ambulation. Charges:  Timed Code Treatment Minutes: 45   Total Treatment Minutes: 45      [] EVAL (LOW) 15160 (typically 20 minutes face-to-face)  [] EVAL (MOD) 35073 (typically 30 minutes face-to-face)  [] EVAL (HIGH) 56236 (typically 45 minutes face-to-face)  [] RE-EVAL     [x] FM(83250) x    [] Dry needle 1 or 2 Muscles (58656)  [x] NMR (53308) x     [] Dry needle 3+ Muscles (38477)  [] Manual (37047) x     [] Ultrasound (84644) x  [x] TA (25499) x     [] Mech Traction (68933)  [] ES(attended) (82055)     [] ES (un) (29826):   [] Vasopump (94783) [] Ionto (51563)   [] Other:    GOALS:    Patient stated goal: \"be able to walk without any help\"  []? Progressing: []?  Met: []? Not Met: []? Adjusted     Therapist goals for Patient:   Short Term Goals: To be achieved in: 2 weeks  1. Independent in HEP and progression per patient tolerance, in order to prevent re-injury. []? Progressing: []? Met: []? Not Met: []? Adjusted  2. Patient will have a decrease in pain to facilitate improvement in movement, function, and ADLs as indicated by Functional Deficits. []? Progressing: []? Met: []? Not Met: []? Adjusted     Long Term Goals: To be achieved in: 8 weeks  1. Disability index score of 10% or less for the [facility] to assist with reaching prior level of function. []? Progressing: []? Met: []? Not Met: []? Adjusted  2. Patient will demonstrate improvement in balance by scoring 36 or higher per [facility] Assessment. []? Progressing: []? Met: []? Not Met: []? Adjusted  3. Patient will demonstrate an increase in Strength to good proximal hip strength and control, in LE to allow for proper functional mobility as indicated by patients Functional Deficits. []? Progressing: []? Met: []? Not Met: []? Adjusted  4. Patient will return to walking within the home without reports of loss of balance. []? Progressing: []? Met: []? Not Met: []? Adjusted  5. Patient will be able to walk up and down stairs at home with improved confidence. []? Progressing: []? Met: []? Not Met: []? Adjusted     ASSESSMENT:  See eval    Treatment/Activity Tolerance:  [x] Patient tolerated treatment well [] Patient limited by fatique  [] Patient limited by pain  [] Patient limited by other medical complications  [] Other:     Overall Progression Towards Functional goals/ Treatment Progress Update:  [] Patient is progressing as expected towards functional goals listed. [] Progression is slowed due to complexities/Impairments listed. [] Progression has been slowed due to co-morbidities.   [x] Plan just implemented, too soon to assess goals progression <30days   [] Goals require adjustment due to lack of progress  [] Patient is not progressing as expected and requires additional follow up with physician  [] Other    Prognosis for POC: [x] Good [] Fair  [] Poor    Patient requires continued skilled intervention: [x] Yes  [] No        PLAN:   [] Continue per plan of care [] Alter current plan (see comments)  [x] Plan of care initiated [] Hold pending MD visit [] Discharge    Electronically signed by: Yamilka Olivier PT , BRENNON-C, [id number]      Note: If patient does not return for scheduled/recommended follow up visits, this note will serve as a discharge from care along with the most recent update on progress.

## 2021-11-04 ENCOUNTER — HOSPITAL ENCOUNTER (OUTPATIENT)
Dept: PHYSICAL THERAPY | Age: 76
Setting detail: THERAPIES SERIES
Discharge: HOME | End: 2021-11-04
Payer: MEDICARE

## 2021-11-04 PROCEDURE — 97110 THERAPEUTIC EXERCISES: CPT

## 2021-11-04 PROCEDURE — 97112 NEUROMUSCULAR REEDUCATION: CPT

## 2021-11-04 PROCEDURE — 97530 THERAPEUTIC ACTIVITIES: CPT

## 2021-11-04 NOTE — FLOWSHEET NOTE
[facility]  Outpatient Rehabilitation and Therapy, [facility] [address] [address]  Phone: [phone]   Fax:     [phone]      Physical Therapy Treatment Note/ Progress Report:     Date:  2021    Patient Name:  Laura Strauss    :    MRN: [id number]    Pertinent Medical History:     Medical/Treatment Diagnosis Information:  · Diagnosis: Unsteady gait when walking (R26.81); Numbness and tingling of left leg (R20.0, R 20.2)  · Treatment Diagnosis: Decreased ADL status    Insurance/Certification information:  PT Insurance Information: [facility]  Physician Information:  Referring Practitioner: Dr. Rea Pimentel of care signed (Y/N): N    Date of Patient follow up with Physician:      Progress Report: []  Yes  [x]  No     Date Range for reporting period:  Beginning:  10/15/21  Ending:      Progress report due (10 Rx/or 30 days whichever is less):      Recertification due (POC duration/ or 90 days whichever is less):      Visit # POC/Insurance Allowable Auth Needed   6 16 []Yes   [x]No     Latex Allergy:  [x]NO      []YES  Preferred Language for Healthcare:   [x]English       []other:  Functional Scale: 10/15/21 WOMAC =     SINGH/56  Tinetti: Balance 8, Gait 5, Total 13      Pain level:  1-2/10 L toes    History of Injury: Patient stated she has had 3 falls recently. Pt has fallen forward on flat concrete, one time her leg gave out in a parking lot, third time pt fell forward and L into bushes. Pt has not fallen backward or to the right. Pt notices when she walks she is \"veering towards the left. \"  Pt looses balance frequently \"where I catch myself\" by \"grabbing a hold of what is there\" in the home. Also, cane helps \"but its wobbly, my walker does better. .. but its hard to get it in the car. \"  Pt's rolling walker \"has a seat on it. \" Pt ambulated into the PT clinic with a cane.  Pt stated she owns a collapsible walker and demonstrated with a PT department rolling walker that she knew how to ambulate with it and collapse it.    Pt had a brain scan performed which showed history of small strokes    SUBJECTIVE:  See eval  10/26/21 pt stated is \"paranoid I'm going to fall\"  11/2/21 pt stated her body is tired after putting on swimming pool cover, which took 3 hours to perform    OBJECTIVE:   Observation:    10/15/21  Functional Mobility/Transfers: transfers with S   Gait: with cane: decreased step length B, decrease trunk rotation, occasionally dragged L foot  Toe walk: (with HHA) (+), unable to perform; able to perform 3 heel raises with HHA in standing  TUG: with cane 37 seconds; with rolling walker 30 seconds   11/2/21  TUG: with rolling walker 34 seconds     Test measurements:      Strength  LEFT  10/15/21 RIGHT  10/15/21 Left  11/2/21 Right  11/2/21   HIP Flexors 3+/5 4-/5 4-/5 4-/5   Knee EXT (quad) 4-/5 4+/5 4-/5 4+/5   Knee Flex (HS) 4+/5 4+/5 5/5 5/5   Ankle DF 4-/5 4+/5 4+/5 4+/5   Ankle PF 4-/5 4/5 4/5 4+/5   Ankle Inv 4/5 4+/5 4-/5 4+/5   Ankle EV 4-/5 5/5 3+/5 4/5         RESTRICTIONS/PRECAUTIONS:     Exercises/Interventions:     Therapeutic Ex (15623)   Min: 15 Reps/Resistance Notes/CUES   Nu Step 5'    Sitting trunk t-band  Flexion  Rotation   Orange 15x  Green 15x L/R    Standing BAPS L/R  PF/DF  INV/EV  CW  CCW   20x  20x  20x  20x With S assist                       Manual Intervention (64037)  Min:                                   NMR re-education (08239)  Min: 15  CUES NEEDED   Standing march t-slide Yellow 10x2    Standing ABD t-slide Yellow 10x2              SANDER  Knee flexion  Knee extension Settings 8 and 9, 10x each    Concentric B LE simulaneous  Quads  Hamstrings   10 kg, 2x10 (setting 8)  10 kg, 2x10 (setting 9)    Therapeutic Activity (50478)  Min: 15     Airex  Static balance  Weight shift lateral  Weight shift   2'  20x  20x    Side stepping  Backward walking 10', 4x, L/R, HHA  10', 4x, HHA Standing rhythmic stabilization on Airex 15x AP  15x Lateral    Walk up/down 4 steps 5x, S assist 2 rails             Modalities  Min:     IFC with      CP after exercises     MH after exercises            Other Therapeutic Activities: Pt was educated on PT POC, Diagnosis, Prognosis, pathomechanics as well as frequency and duration of scheduling future physical therapy appointments. Time was also taken on this day to answer all patient questions and participation in PT. Reviewed appointment policy in detail with patient and patient verbalized understanding. Home Exercise Program: Patient was instructed in the following for HEP:      Access Code: [email or web address]  URL: [email or web address]  Date: 10/15/2021  Prepared by: Dickson Payton     Exercises  Seated Heel Raise - 1 x daily - 7 x weekly - 10 reps  Seated Toe Raise - 1 x daily - 7 x weekly - 10 reps  Seated Ankle Circles - 1 x daily - 7 x weekly - 10 reps  Seated Long Arc Quad - 1 x daily - 7 x weekly - 10 reps  Seated March - 1 x daily - 7 x weekly - 10 reps  Seated Forward Bending - 1 x daily - 7 x weekly - 10 reps  Seated Trunk Rotation - Arms Crossed - 1 x daily - 7 x weekly - 10 reps       Therapeutic Exercise and NMR EXR  [] (08372) Provided verbal/tactile cueing for activities related to strengthening, flexibility, endurance, ROM for improvements in LE, proximal hip, and core control with self care, mobility, lifting, ambulation.  [] (53066) Provided verbal/tactile cueing for activities related to improving balance, coordination, kinesthetic sense, posture, motor skill, proprioception  to assist with LE, proximal hip, and core control in self care, mobility, lifting, ambulation and eccentric single leg control.      NMR and Therapeutic Activities:    [] (85493 or 91557) Provided verbal/tactile cueing for activities related to improving balance, coordination, kinesthetic sense, posture, motor skill, proprioception and motor activation to allow for proper function of core, proximal hip and LE with self care and ADLs and functional mobility.   [] (10710) Gait Re-education- Provided training and instruction to the patient for proper LE, core and proximal hip recruitment and positioning and eccentric body weight control with ambulation re-education including up and down stairs     Home Exercise Program:    [x] (65365) Reviewed/Progressed HEP activities related to strengthening, flexibility, endurance, ROM of core, proximal hip and LE for functional self-care, mobility, lifting and ambulation/stair navigation   [] (22625)Reviewed/Progressed HEP activities related to improving balance, coordination, kinesthetic sense, posture, motor skill, proprioception of core, proximal hip and LE for self care, mobility, lifting, and ambulation/stair navigation      Manual Treatments:  PROM / STM / Oscillations-Mobs:  G-I, II, III, IV (PA's, Inf., Post.)  [] (40940) Provided manual therapy to mobilize LE, proximal hip and/or LS spine soft tissue/joints for the purpose of modulating pain, promoting relaxation,  increasing ROM, reducing/eliminating soft tissue swelling/inflammation/restriction, improving soft tissue extensibility and allowing for proper ROM for normal function with self care, mobility, lifting and ambulation. Charges:  Timed Code Treatment Minutes: 45   Total Treatment Minutes: 45      [] EVAL (LOW) 30657 (typically 20 minutes face-to-face)  [] EVAL (MOD) 25241 (typically 30 minutes face-to-face)  [] EVAL (HIGH) 40451 (typically 45 minutes face-to-face)  [] RE-EVAL     [x] SB(10841) x    [] Dry needle 1 or 2 Muscles (10267)  [x] NMR (55353) x     [] Dry needle 3+ Muscles (02534)  [] Manual (26436) x     [] Ultrasound (74590) x  [x] TA (48136) x     [] Mech Traction (71882)  [] ES(attended) (59622)     [] ES (un) (95615):   [] Vasopump (38134) [] Ionto (56771)   [] Other:    GOALS:    Patient stated goal: \"be able to walk without any help\"  []? Progressing: []?  Met: []? Not Met: []? Adjusted     Therapist goals for Patient:   Short Term Goals: To be achieved in: 2 weeks  1. Independent in HEP and progression per patient tolerance, in order to prevent re-injury. []? Progressing: []? Met: []? Not Met: []? Adjusted  2. Patient will have a decrease in pain to facilitate improvement in movement, function, and ADLs as indicated by Functional Deficits. []? Progressing: []? Met: []? Not Met: []? Adjusted     Long Term Goals: To be achieved in: 8 weeks  1. Disability index score of 10% or less for the U [facility] to assist with reaching prior level of function. []? Progressing: []? Met: []? Not Met: []? Adjusted  2. Patient will demonstrate improvement in balance by scoring 36 or higher per [facility] Assessment. []? Progressing: []? Met: []? Not Met: []? Adjusted  3. Patient will demonstrate an increase in Strength to good proximal hip strength and control, in LE to allow for proper functional mobility as indicated by patients Functional Deficits. []? Progressing: []? Met: []? Not Met: []? Adjusted  4. Patient will return to walking within the home without reports of loss of balance. []? Progressing: []? Met: []? Not Met: []? Adjusted  5. Patient will be able to walk up and down stairs at home with improved confidence. []? Progressing: []? Met: []? Not Met: []? Adjusted     ASSESSMENT:  See eval    Treatment/Activity Tolerance:  [x] Patient tolerated treatment well [] Patient limited by fatique  [] Patient limited by pain  [] Patient limited by other medical complications  [] Other:     Overall Progression Towards Functional goals/ Treatment Progress Update:  [] Patient is progressing as expected towards functional goals listed. [] Progression is slowed due to complexities/Impairments listed. [] Progression has been slowed due to co-morbidities.   [x] Plan just implemented, too soon to assess goals progression <30days   [] Goals require adjustment due to lack of progress  [] Patient is not progressing as expected and requires additional follow up with physician  [] Other    Prognosis for POC: [x] Good [] Fair  [] Poor    Patient requires continued skilled intervention: [x] Yes  [] No        PLAN:   [] Continue per plan of care [] Alter current plan (see comments)  [x] Plan of care initiated [] Hold pending MD visit [] Discharge    Electronically signed by: Toyin Weinstein PT , PETER, [id number]      Note: If patient does not return for scheduled/recommended follow up visits, this note will serve as a discharge from care along with the most recent update on progress.

## 2021-11-09 ENCOUNTER — HOSPITAL ENCOUNTER (OUTPATIENT)
Dept: PHYSICAL THERAPY | Age: 76
Setting detail: THERAPIES SERIES
Discharge: HOME | End: 2021-11-09
Payer: MEDICARE

## 2021-11-09 PROCEDURE — 97530 THERAPEUTIC ACTIVITIES: CPT

## 2021-11-09 PROCEDURE — 97112 NEUROMUSCULAR REEDUCATION: CPT

## 2021-11-09 PROCEDURE — 97110 THERAPEUTIC EXERCISES: CPT

## 2021-11-09 NOTE — FLOWSHEET NOTE
[facility]  Outpatient Rehabilitation and Therapy, [address] [place] [address]  Phone: [phone]   Fax:     [phone]      Physical Therapy Treatment Note/ Progress Report:     Date:  2021    Patient Name:  Matthew Jalloh    :  [age]  MRN: [id number]    Pertinent Medical History:     Medical/Treatment Diagnosis Information:  · Diagnosis: Unsteady gait when walking (R26.81); Numbness and tingling of left leg (R20.0, R 20.2)  · Treatment Diagnosis: Decreased ADL status    Insurance/Certification information:  PT Insurance Information: [facility]  Physician Information:  Referring Practitioner: Dr. Vishnu Ceballos of care signed (Y/N): N    Date of Patient follow up with Physician:      Progress Report: []  Yes  [x]  No     Date Range for reporting period:  Beginning:  10/15/21  Ending:      Progress report due (10 Rx/or 30 days whichever is less):      Recertification due (POC duration/ or 90 days whichever is less):      Visit # POC/Insurance Allowable Auth Needed   7 16 []Yes   [x]No     Latex Allergy:  [x]NO      []YES  Preferred Language for Healthcare:   [x]English       []other:  Functional Scale: 10/15/21 WOMAC = 13/    SINGH/56  Tinetti: Balance 8, Gait 5, Total 13      Pain level:  1-2/10 L toes    History of Injury: Patient stated she has had 3 falls recently. Pt has fallen forward on flat concrete, one time her leg gave out in a parking lot, third time pt fell forward and L into bushes. Pt has not fallen backward or to the right. Pt notices when she walks she is \"veering towards the left. \"  Pt looses balance frequently \"where I catch myself\" by \"grabbing a hold of what is there\" in the home. Also, cane helps \"but its wobbly, my walker does better. .. but its hard to get it in the car. \"  Pt's rolling walker \"has a seat on it. \" Pt ambulated into the PT clinic with a cane.  Pt stated she owns a collapsible walker and demonstrated with a PT department rolling walker that she knew how to ambulate with it and collapse it.    Pt had a brain scan performed which showed history of small strokes    SUBJECTIVE:  See eval  10/26/21 pt stated is \"paranoid I'm going to fall\"  11/2/21 pt stated her body is tired after putting on swimming pool cover, which took 3 hours to perform    OBJECTIVE:   Observation:    10/15/21  Functional Mobility/Transfers: transfers with S   Gait: with cane: decreased step length B, decrease trunk rotation, occasionally dragged L foot  Toe walk: (with HHA) (+), unable to perform; able to perform 3 heel raises with HHA in standing  TUG: with cane 37 seconds; with rolling walker 30 seconds   11/2/21  TUG: with rolling walker 34 seconds     Test measurements:      Strength  LEFT  10/15/21 RIGHT  10/15/21 Left  11/2/21 Right  11/2/21   HIP Flexors 3+/5 4-/5 4-/5 4-/5   Knee EXT (quad) 4-/5 4+/5 4-/5 4+/5   Knee Flex (HS) 4+/5 4+/5 5/5 5/5   Ankle DF 4-/5 4+/5 4+/5 4+/5   Ankle PF 4-/5 4/5 4/5 4+/5   Ankle Inv 4/5 4+/5 4-/5 4+/5   Ankle EV 4-/5 5/5 3+/5 4/5         RESTRICTIONS/PRECAUTIONS:     Exercises/Interventions:     Therapeutic Ex (99121)   Min: 15 Reps/Resistance Notes/CUES   Nu Step 5'    Sitting trunk t-band  Flexion  Rotation   Orange 15x  Green 15x L/R    Standing BAPS L/R  PF/DF  INV/EV  CW  CCW   20x  20x  20x  20x With S assist                       Manual Intervention (03443)  Min:                                   NMR re-education (84963)  Min: 15  CUES NEEDED   Standing march t-slide Yellow 10x2    Standing ABD t-slide Yellow 10x2              SANDER  Knee flexion  Knee extension Settings 8 and 9, 10x each    Concentric B LE simulaneous  Quads  Hamstrings   10 kg, 2x10 (setting 8)  10 kg, 2x10 (setting 9)    Therapeutic Activity (89172)  Min: 15     Airex  Static balance  Weight shift lateral  Weight shift   2'  20x  20x    Side stepping  Backward walking 10', 4x, L/R, HHA  10', 4x, HHA Standing rhythmic stabilization on Airex 15x AP  15x Lateral    Walk up/down 4 steps 5x, S assist 2 rails             Modalities  Min:     IFC with      CP after exercises     MH after exercises            Other Therapeutic Activities: Pt was educated on PT POC, Diagnosis, Prognosis, pathomechanics as well as frequency and duration of scheduling future physical therapy appointments. Time was also taken on this day to answer all patient questions and participation in PT. Reviewed appointment policy in detail with patient and patient verbalized understanding. Home Exercise Program: Patient was instructed in the following for HEP:      Access Code: [email or web address]  URL: [email or web address]  Date: 10/15/2021  Prepared by: Concepción Haynes     Exercises  Seated Heel Raise - 1 x daily - 7 x weekly - 10 reps  Seated Toe Raise - 1 x daily - 7 x weekly - 10 reps  Seated Ankle Circles - 1 x daily - 7 x weekly - 10 reps  Seated Long Arc Quad - 1 x daily - 7 x weekly - 10 reps  Seated March - 1 x daily - 7 x weekly - 10 reps  Seated Forward Bending - 1 x daily - 7 x weekly - 10 reps  Seated Trunk Rotation - Arms Crossed - 1 x daily - 7 x weekly - 10 reps       Therapeutic Exercise and NMR EXR  [] (08575) Provided verbal/tactile cueing for activities related to strengthening, flexibility, endurance, ROM for improvements in LE, proximal hip, and core control with self care, mobility, lifting, ambulation.  [] (19511) Provided verbal/tactile cueing for activities related to improving balance, coordination, kinesthetic sense, posture, motor skill, proprioception  to assist with LE, proximal hip, and core control in self care, mobility, lifting, ambulation and eccentric single leg control.      NMR and Therapeutic Activities:    [] (17426 or 53599) Provided verbal/tactile cueing for activities related to improving balance, coordination, kinesthetic sense, posture, motor skill, proprioception and motor activation to allow for proper function of core, proximal hip and LE with self care and ADLs and functional mobility.   [] (57348) Gait Re-education- Provided training and instruction to the patient for proper LE, core and proximal hip recruitment and positioning and eccentric body weight control with ambulation re-education including up and down stairs     Home Exercise Program:    [x] (10772) Reviewed/Progressed HEP activities related to strengthening, flexibility, endurance, ROM of core, proximal hip and LE for functional self-care, mobility, lifting and ambulation/stair navigation   [] (71946)Reviewed/Progressed HEP activities related to improving balance, coordination, kinesthetic sense, posture, motor skill, proprioception of core, proximal hip and LE for self care, mobility, lifting, and ambulation/stair navigation      Manual Treatments:  PROM / STM / Oscillations-Mobs:  G-I, II, III, IV (PA's, Inf., Post.)  [] (56800) Provided manual therapy to mobilize LE, proximal hip and/or LS spine soft tissue/joints for the purpose of modulating pain, promoting relaxation,  increasing ROM, reducing/eliminating soft tissue swelling/inflammation/restriction, improving soft tissue extensibility and allowing for proper ROM for normal function with self care, mobility, lifting and ambulation. Charges:  Timed Code Treatment Minutes: 45   Total Treatment Minutes: 45      [] EVAL (LOW) 41315 (typically 20 minutes face-to-face)  [] EVAL (MOD) 65182 (typically 30 minutes face-to-face)  [] EVAL (HIGH) 00689 (typically 45 minutes face-to-face)  [] RE-EVAL     [x] CS(31904) x    [] Dry needle 1 or 2 Muscles (86828)  [x] NMR (10452) x     [] Dry needle 3+ Muscles (24942)  [] Manual (34545) x     [] Ultrasound (53250) x  [x] TA (19957) x     [] Mech Traction (42508)  [] ES(attended) (13508)     [] ES (un) (24154):   [] Vasopump (00504) [] Ionto (63985)   [] Other:    GOALS:    Patient stated goal: \"be able to walk without any help\"  []? Progressing: []?  Met: []? Not Met: []? Adjusted     Therapist goals for Patient:   Short Term Goals: To be achieved in: 2 weeks  1. Independent in HEP and progression per patient tolerance, in order to prevent re-injury. []? Progressing: []? Met: []? Not Met: []? Adjusted  2. Patient will have a decrease in pain to facilitate improvement in movement, function, and ADLs as indicated by Functional Deficits. []? Progressing: []? Met: []? Not Met: []? Adjusted     Long Term Goals: To be achieved in: 8 weeks  1. Disability index score of 10% or less for the [facility] to assist with reaching prior level of function. []? Progressing: []? Met: []? Not Met: []? Adjusted  2. Patient will demonstrate improvement in balance by scoring 36 or higher per [facility] Assessment. []? Progressing: []? Met: []? Not Met: []? Adjusted  3. Patient will demonstrate an increase in Strength to good proximal hip strength and control, in LE to allow for proper functional mobility as indicated by patients Functional Deficits. []? Progressing: []? Met: []? Not Met: []? Adjusted  4. Patient will return to walking within the home without reports of loss of balance. []? Progressing: []? Met: []? Not Met: []? Adjusted  5. Patient will be able to walk up and down stairs at home with improved confidence. []? Progressing: []? Met: []? Not Met: []? Adjusted     ASSESSMENT:  See eval    Treatment/Activity Tolerance:  [x] Patient tolerated treatment well [] Patient limited by fatique  [] Patient limited by pain  [] Patient limited by other medical complications  [] Other:     Overall Progression Towards Functional goals/ Treatment Progress Update:  [] Patient is progressing as expected towards functional goals listed. [] Progression is slowed due to complexities/Impairments listed. [] Progression has been slowed due to co-morbidities.   [x] Plan just implemented, too soon to assess goals progression <30days   [] Goals require adjustment due to lack of progress  [] Patient is not progressing as expected and requires additional follow up with physician  [] Other    Prognosis for POC: [x] Good [] Fair  [] Poor    Patient requires continued skilled intervention: [x] Yes  [] No        PLAN:   [] Continue per plan of care [] Alter current plan (see comments)  [x] Plan of care initiated [] Hold pending MD visit [] Discharge    Electronically signed by: Roxanne Gay PT , FRANCIAC, [id number]      Note: If patient does not return for scheduled/recommended follow up visits, this note will serve as a discharge from care along with the most recent update on progress.

## 2021-11-11 ENCOUNTER — HOSPITAL ENCOUNTER (OUTPATIENT)
Dept: PHYSICAL THERAPY | Age: 76
Setting detail: THERAPIES SERIES
Discharge: HOME | End: 2021-11-11
Payer: MEDICARE

## 2021-11-11 PROCEDURE — 97112 NEUROMUSCULAR REEDUCATION: CPT

## 2021-11-11 PROCEDURE — 97530 THERAPEUTIC ACTIVITIES: CPT

## 2021-11-11 PROCEDURE — 97110 THERAPEUTIC EXERCISES: CPT

## 2021-11-11 NOTE — FLOWSHEET NOTE
[facility]  Outpatient Rehabilitation and Therapy, [address] [place] [address]  Phone: [phone]   Fax:     [phone]       Physical Therapy Discharge Summary    Dear Dr. Tari Kenyon ,    We had the pleasure of treating the following patient for physical therapy services at [address] [place] [address]. A summary of our findings can be found in the discharge summary below. If you have any questions or concerns regarding these findings, please do not hesitate to contact me at the office phone number above. Thank you for the referral.       Overall Response to Treatment:   []Patient is responding well to treatment and improvement is noted with regards  to goals   []Patient should continue to improve in reasonable time if they continue HEP   [x]Patient has plateaued and is no longer responding to skilled PT intervention    []Patient is getting worse and would benefit from return to referring MD   []Patient unable to adhere to initial POC   []Other: Pt did not demonstrate much improvement with balance, but pt started to use rolling walker more regularly and feels more secure when on her feet by using the rolling walker. Pt's safety improved. B LE strength improved somewhat, but the L LE was weaker than the R LE. Date range of Visits: 10/15/21-21  Total Visits: [address] [place] [facility], [facility] [address] [place] [address]  Phone: [phone]   Fax:     [phone]      Physical Therapy Treatment Note/ Progress Report:     Date:  2021    Patient Name:  Angelita Banks    :  [birth year]  MRN: [id number]    Pertinent Medical History:     Medical/Treatment Diagnosis Information:  · Diagnosis: Unsteady gait when walking (R26.81);  Numbness and tingling of left leg (R20.0, R 20.2)  · Treatment Diagnosis: Decreased ADL not fallen recently as she started to use a rolling walker more often. Pt has more confidence with her balance when using a rolling walker. Pt continues to \"veer to the left. \"    OBJECTIVE:   Observation:    10/15/21  Functional Mobility/Transfers: transfers with S   Gait: with cane: decreased step length B, decrease trunk rotation, occasionally dragged L foot  Toe walk: (with HHA) (+), unable to perform; able to perform 3 heel raises with HHA in standing  TUG: with cane 37 seconds; with rolling walker 30 seconds   11/2/21  TUG: with rolling walker 34 seconds    11/11/21  Functional Mobility/Transfers: continued per 10/15/21 transfers with S   Gait: continued per 10/15/21 with cane: decreased step length B, decrease trunk rotation, occasionally dragged L foot       Test measurements:      Strength  LEFT  10/15/21 RIGHT  10/15/21 Left  11/2/21 Right  11/2/21   HIP Flexors 3+/5 4-/5 4-/5 4-/5   Knee EXT (quad) 4-/5 4+/5 4-/5 4+/5   Knee Flex (HS) 4+/5 4+/5 5/5 5/5   Ankle DF 4-/5 4+/5 4+/5 4+/5   Ankle PF 4-/5 4/5 4/5 4+/5   Ankle Inv 4/5 4+/5 4-/5 4+/5   Ankle EV 4-/5 5/5 3+/5 4/5         RESTRICTIONS/PRECAUTIONS:     Exercises/Interventions:     Therapeutic Ex (60062)   Min: 15 Reps/Resistance Notes/CUES   Nu Step 5'    Sitting trunk t-band  Flexion  Rotation   Orange 15x  Green 15x L/R    Standing BAPS L/R  PF/DF  INV/EV  CW  CCW   20x  20x  20x  20x With S assist                       Manual Intervention (78099)  Min:                                   NMR re-education (82883)  Min: 10  CUES NEEDED                       SANDER  Knee flexion  Knee extension Settings 8 and 9, 10x each    Concentric B LE simulaneous  Quads  Hamstrings   10 kg, 2x10 (setting 8)  10 kg, 2x10 (setting 9)    Therapeutic Activity (55240)  Min: 15     Airex  Weight shift lateral  Weight shift   20x  20x    Side stepping  Backward walking 10', 4x, L/R, HHA  10', 4x, HHA    Standing rhythmic stabilization on Airex 15x AP  15x Lateral and functional mobility.   [] (41635) Gait Re-education- Provided training and instruction to the patient for proper LE, core and proximal hip recruitment and positioning and eccentric body weight control with ambulation re-education including up and down stairs     Home Exercise Program:    [x] (74216) Reviewed/Progressed HEP activities related to strengthening, flexibility, endurance, ROM of core, proximal hip and LE for functional self-care, mobility, lifting and ambulation/stair navigation   [] (15061)Reviewed/Progressed HEP activities related to improving balance, coordination, kinesthetic sense, posture, motor skill, proprioception of core, proximal hip and LE for self care, mobility, lifting, and ambulation/stair navigation      Manual Treatments:  PROM / STM / Oscillations-Mobs:  G-I, II, III, IV (PA's, Inf., Post.)  [] (17844) Provided manual therapy to mobilize LE, proximal hip and/or LS spine soft tissue/joints for the purpose of modulating pain, promoting relaxation,  increasing ROM, reducing/eliminating soft tissue swelling/inflammation/restriction, improving soft tissue extensibility and allowing for proper ROM for normal function with self care, mobility, lifting and ambulation. Charges:  Timed Code Treatment Minutes: 40   Total Treatment Minutes: 40      [] EVAL (LOW) 90491 (typically 20 minutes face-to-face)  [] EVAL (MOD) 49851 (typically 30 minutes face-to-face)  [] EVAL (HIGH) 63307 (typically 45 minutes face-to-face)  [] RE-EVAL     [x] XO(57937) x    [] Dry needle 1 or 2 Muscles (52817)  [x] NMR (86286) x     [] Dry needle 3+ Muscles (17722)  [] Manual (82876) x     [] Ultrasound (84033) x  [x] TA (28239) x     [] Mech Traction (22320)  [] ES(attended) (41182)     [] ES (un) (79579):   [] Vasopump (21035) [] Ionto (09015)   [] Other:    GOALS:    Patient stated goal: \"be able to walk without any help\"  []? Progressing: []? Met: []? Not Met: []?  Adjusted     Therapist goals for Patient: Short Term Goals: To be achieved in: 2 weeks  1. Independent in HEP and progression per patient tolerance, in order to prevent re-injury. []? Progressing: []? Met: []? Not Met: []? Adjusted  2. Patient will have a decrease in pain to facilitate improvement in movement, function, and ADLs as indicated by Functional Deficits. []? Progressing: []? Met: []? Not Met: []? Adjusted     Long Term Goals: To be achieved in: 8 weeks  1. Disability index score of 10% or less for the [facility] to assist with reaching prior level of function. []? Progressing: []? Met: []? Not Met: []? Adjusted  2. Patient will demonstrate improvement in balance by scoring 36 or higher per [facility] Assessment. []? Progressing: []? Met: []? Not Met: []? Adjusted  3. Patient will demonstrate an increase in Strength to good proximal hip strength and control, in LE to allow for proper functional mobility as indicated by patients Functional Deficits. []? Progressing: []? Met: []? Not Met: []? Adjusted  4. Patient will return to walking within the home without reports of loss of balance. []? Progressing: []? Met: []? Not Met: []? Adjusted  5. Patient will be able to walk up and down stairs at home with improved confidence. []? Progressing: []? Met: []? Not Met: []? Adjusted     ASSESSMENT:  See eval    Treatment/Activity Tolerance:  [x] Patient tolerated treatment well [] Patient limited by fatique  [] Patient limited by pain  [] Patient limited by other medical complications  [] Other:     Overall Progression Towards Functional goals/ Treatment Progress Update:  [] Patient is progressing as expected towards functional goals listed. [] Progression is slowed due to complexities/Impairments listed. [] Progression has been slowed due to co-morbidities.   [x] Plan just implemented, too soon to assess goals progression <30days   [] Goals require adjustment due to lack of progress  [] Patient is not progressing as expected and requires additional follow up with physician  [] Other    Prognosis for POC: [x] Good [] Fair  [] Poor    Patient requires continued skilled intervention: [x] Yes  [] No        PLAN:   [] Continue per plan of care [] Alter current plan (see comments)  [x] Plan of care initiated [] Hold pending MD visit [] Discharge    Electronically signed by: Rafa Hameed PT , OMT-C, [id number]      Note: If patient does not return for scheduled/recommended follow up visits, this note will serve as a discharge from care along with the most recent update on progress.

## 2021-12-09 ENCOUNTER — HOSPITAL ENCOUNTER (OUTPATIENT)
Dept: WOMENS IMAGING | Age: 76
Discharge: HOME | End: 2021-12-09
Payer: MEDICARE

## 2021-12-09 DIAGNOSIS — Z85.3 PERSONAL HISTORY OF BREAST CANCER: ICD-10-CM

## 2021-12-09 DIAGNOSIS — Z12.31 VISIT FOR SCREENING MAMMOGRAM: ICD-10-CM

## 2021-12-09 PROCEDURE — 77063 BREAST TOMOSYNTHESIS BI: CPT

## 2022-03-15 ENCOUNTER — HOSPITAL ENCOUNTER (OUTPATIENT)
Dept: CT IMAGING | Age: 77
Discharge: HOME | End: 2022-03-15
Payer: MEDICARE

## 2022-03-15 DIAGNOSIS — R35.1 NOCTURIA: ICD-10-CM

## 2022-03-15 PROCEDURE — 74176 CT ABD & PELVIS W/O CONTRAST: CPT

## 2022-08-02 ENCOUNTER — HOSPITAL ENCOUNTER (OUTPATIENT)
Dept: NUCLEAR MEDICINE | Age: 77
Discharge: HOME | End: 2022-08-02
Payer: MEDICARE

## 2022-08-02 DIAGNOSIS — G06.0 BRAIN ABSCESS: ICD-10-CM

## 2022-08-02 PROCEDURE — 3430000000 HC RX DIAGNOSTIC RADIOPHARMACEUTICAL: Performed by: PSYCHIATRY & NEUROLOGY

## 2022-08-02 PROCEDURE — 2580000003 HC RX 258: Performed by: PSYCHIATRY & NEUROLOGY

## 2022-08-02 PROCEDURE — A9584 IODINE I-123 IOFLUPANE: HCPCS | Performed by: PSYCHIATRY & NEUROLOGY

## 2022-08-02 PROCEDURE — A9584 IODINE I-123 IOFLUPANE: HCPCS

## 2022-08-02 PROCEDURE — 3430000000 HC RX DIAGNOSTIC RADIOPHARMACEUTICAL

## 2022-08-02 PROCEDURE — 78803 RP LOCLZJ TUM SPECT 1 AREA: CPT

## 2022-08-02 PROCEDURE — 6370000000 HC RX 637 (ALT 250 FOR IP): Performed by: PSYCHIATRY & NEUROLOGY

## 2022-08-02 RX ORDER — IODINE SOLUTION STRONG 5% (LUGOL'S) 5 %
4 SOLUTION ORAL
Status: COMPLETED | OUTPATIENT
Start: 2022-08-02 | End: 2022-08-02

## 2022-08-02 RX ORDER — SODIUM CHLORIDE 0.9 % (FLUSH) 0.9 %
5-40 SYRINGE (ML) INJECTION PRN
Status: DISCONTINUED | OUTPATIENT
Start: 2022-08-02 | End: 2022-08-03 | Stop reason: HOSPADM

## 2022-08-02 RX ADMIN — IODINE SOLUTION STRONG 5% (LUGOL'S) 0.2 ML: 5 SOLUTION at 08:11

## 2022-08-02 RX ADMIN — Medication 10 ML: at 09:17

## 2022-08-02 RX ADMIN — IOFLUPANE I-123 5.08 MILLICURIE: 2 INJECTION, SOLUTION INTRAVENOUS at 09:15

## 2022-09-29 ENCOUNTER — OFFICE VISIT (OUTPATIENT)
Dept: ORTHOPEDIC SURGERY | Age: 77
End: 2022-09-29
Payer: MEDICARE

## 2022-09-29 VITALS — BODY MASS INDEX: 34.02 KG/M2 | HEIGHT: 62 IN

## 2022-09-29 DIAGNOSIS — Z96.642 HISTORY OF TOTAL HIP ARTHROPLASTY, LEFT: ICD-10-CM

## 2022-09-29 DIAGNOSIS — Z96.651 HISTORY OF TOTAL KNEE ARTHROPLASTY, RIGHT: ICD-10-CM

## 2022-09-29 DIAGNOSIS — Z96.652 HISTORY OF TOTAL KNEE ARTHROPLASTY, LEFT: ICD-10-CM

## 2022-09-29 DIAGNOSIS — Z96.641 HISTORY OF TOTAL HIP ARTHROPLASTY, RIGHT: Primary | ICD-10-CM

## 2022-09-29 PROCEDURE — 3017F COLORECTAL CA SCREEN DOC REV: CPT | Performed by: PHYSICIAN ASSISTANT

## 2022-09-29 PROCEDURE — 1123F ACP DISCUSS/DSCN MKR DOCD: CPT | Performed by: PHYSICIAN ASSISTANT

## 2022-09-29 PROCEDURE — 1090F PRES/ABSN URINE INCON ASSESS: CPT | Performed by: PHYSICIAN ASSISTANT

## 2022-09-29 PROCEDURE — 1036F TOBACCO NON-USER: CPT | Performed by: PHYSICIAN ASSISTANT

## 2022-09-29 PROCEDURE — G8427 DOCREV CUR MEDS BY ELIG CLIN: HCPCS | Performed by: PHYSICIAN ASSISTANT

## 2022-09-29 PROCEDURE — G8417 CALC BMI ABV UP PARAM F/U: HCPCS | Performed by: PHYSICIAN ASSISTANT

## 2022-09-29 PROCEDURE — G8400 PT W/DXA NO RESULTS DOC: HCPCS | Performed by: PHYSICIAN ASSISTANT

## 2022-09-29 PROCEDURE — 99212 OFFICE O/P EST SF 10 MIN: CPT | Performed by: PHYSICIAN ASSISTANT

## 2022-09-29 NOTE — PROGRESS NOTES
Subjective:      Patient ID: Dayanna Thorne is a 78 y.o.  female. Here for annual follow up visit. S/P right and left knee arthroplasty as well as right and left hip arthroplasty. Surgeon: Gato Red  Issues or complaints: None      Review of Systems:  I have reviewed the clinically relevant past medical history, medications, allergies, family history, social history, and 13 point Review of Systems from the patient's recent history form & documented any details relevant to today's presenting complaints in the history above. The patient's self-reported past medical history, medications, allergies, family history, social history, and Review of Systems form from today's date have been scanned into the chart under the \"Media\" tab.        Past Medical History:   Diagnosis Date    Achalasia     Anemia     Arthritis     Breast cancer ([facility] [address] 2011    left breast mastectomy    Clostridium difficile infection 2/25/14    Diabetes mellitus ([facility] [address]     diet controlled    HBP (high blood pressure)     Hearing impairment     Hyperlipidemia     Nausea & vomiting     PONV (postoperative nausea and vomiting)     Sinus disorder     Tremor of both hands     Wears glasses     Wears hearing aid     bilat    Weight disorder        Family History   Problem Relation Age of Onset    Arthritis Other     Cancer Other     Heart Disease Other     High Blood Pressure Other     Stroke Other        Past Surgical History:   Procedure Laterality Date    BREAST LUMPECTOMY  2011    BREAST SURGERY  left masectomy    ESOPHAGUS SURGERY      HAND SURGERY Right     HYSTERECTOMY (CERVIX STATUS UNKNOWN)      JOINT REPLACEMENT  left knee replacement 2007    JOINT REPLACEMENT Right 1/31/14    Right total hip replacement    JOINT REPLACEMENT Left 12/2014    hip     JOINT REPLACEMENT Right     SHOULDER SURGERY      TONSILLECTOMY         Social History     Occupational History    Not on file   Tobacco Use    Smoking status: Never    Smokeless tobacco: Never   Vaping Use    Vaping Use: Never used   Substance and Sexual Activity    Alcohol use: No     Alcohol/week: 0.0 standard drinks    Drug use: No    Sexual activity: Not on file       Current Outpatient Medications   Medication Sig Dispense Refill    cephALEXin (KEFLEX) 500 MG capsule Take 1 capsule by mouth See Admin Instructions Take 2 tablets one hour before and 2 tablets one hour after dental procedure 4 capsule 0    pravastatin (PRAVACHOL) 20 MG tablet Take 1 tablet by mouth nightly 30 tablet 0    HYDROcodone-acetaminophen (NORCO) 5-325 MG per tablet Take 1 tablet by mouth every 6 hours as needed for Pain . 60 tablet 0    metFORMIN (GLUCOPHAGE-XR) 500 MG extended release tablet Take 1,000 mg by mouth      triamcinolone (KENALOG) 0.1 % ointment APPLY TO SKIN BID FOR 5 DAYS / ONLY USE IF NYSTATIN NOT IMPROVING.  0    esomeprazole Magnesium (NEXIUM) 20 MG PACK Take 20 mg by mouth daily      rOPINIRole (REQUIP) 0.5 MG tablet Take 0.5 mg by mouth 2 times daily 1 tab in am and 2 tabs in pm      losartan (COZAAR) 100 MG tablet Take 100 mg by mouth daily. No current facility-administered medications for this visit. Objective:   She is alert, oriented x 3, pleasant, well nourished, developed and in no acute distress. Ht 5' 2\" (1.575 m)   BMI 34.02 kg/m²      Examination of the right and left knee: Inspection of the skin demonstrates a well-healed midline incision. Inspection of the soft tissues without significant swelling or erythema. The overall alignment of the knee is neutral.  There is minimal intra-articular effusion. AROM     Extension 0     Flexion  120   There no pain associated with ROM testing. Pes anserine bursa non-tender to palpation. Patellar tendon non-tender to palpation. Quadriceps tendon non-tender to palpation. Collateral ligaments non-tender to palpation. Popliteal fossa non-tender to palpation. Retro patellar crepitus is not present.    There is no instability with varus/valgus stress testing in full extension or 90 degrees of flexion. There is no instability with anterior drawer testing. Extensor Mechanism is intact. Examination of the right and left hip:  No pain with active ROM. No pain with passive ROM. No instability, crepitus with ROM. No pain with weight bearing. Intact skin without lacerations or abrasions, no significant erythema, rashes or skin lesions. X Rays: were performed in the office today:   AP Standing, Lateral and Sunrise Left Knee: There is a left cemented knee arthroplasty present. The alignment is satisfactory. There are no signs of failure or loosening. AP Standing, Lateral and Sunrise Right Knee: There is a right cemented total knee arthroplasty present. The alignment is satisfactory. There are no signs of failure or loosening. AP Pelvis, AP and Frog Leg Lateral Left and Right Hip:   There is a left and right total hip arthroplasty present. The alignment is satisfactory. There are no signs of failure, dislocation or loosening. Diagnosis:        ICD-10-CM    1. History of total hip arthroplasty, right  Z96.641 XR HIP 3-4 VW W PELVIS BILATERAL      2. History of total hip arthroplasty, left  Z96.642 XR HIP 3-4 VW W PELVIS BILATERAL      3. History of total knee arthroplasty, right  Z96.651 XR KNEE BILATERAL STANDING     XR KNEE RIGHT (1-2 VIEWS)      4. History of total knee arthroplasty, left  Z96.652 XR KNEE BILATERAL STANDING     XR KNEE LEFT (1-2 VIEWS)             Assessment/ Plan:     Assessment:    Clinically and radiographically stable right and left knee as well as right and left hip arthroplasties. Plan:  Medications-discussed the risk and benefits of prophylactic antibiotics for dental and surgical procedures. PT- A home exercise program was instructed today including ROM exercises and strengthening exercises.  The patient verbalized understanding of these exercises as well as the importance of the exercise program to promote return of normal function. If pain intensifies or other problems arise you are to notify the office. Follow up- 24 months   Call or return to clinic if these symptoms worsen or fail to improve as anticipated.

## 2022-12-15 ENCOUNTER — HOSPITAL ENCOUNTER (OUTPATIENT)
Dept: WOMENS IMAGING | Age: 77
Discharge: HOME | End: 2022-12-15
Payer: MEDICARE

## 2022-12-15 DIAGNOSIS — Z12.31 VISIT FOR SCREENING MAMMOGRAM: ICD-10-CM

## 2022-12-15 LAB
BACTERIA: ABNORMAL /HPF
BILIRUBIN URINE: NEGATIVE
BLOOD, URINE: NEGATIVE
CLARITY: ABNORMAL
COLOR: YELLOW
EPITHELIAL CELLS, UA: 6 /HPF (ref 0–5)
GLUCOSE URINE: NEGATIVE MG/DL
HYALINE CASTS: 0 /LPF (ref 0–8)
KETONES, URINE: NEGATIVE MG/DL
LEUKOCYTE ESTERASE, URINE: ABNORMAL
MICROSCOPIC EXAMINATION: YES
NITRITE, URINE: POSITIVE
PH UA: 6.5 (ref 5–8)
PROTEIN UA: NEGATIVE MG/DL
RBC UA: 1 /HPF (ref 0–4)
SPECIFIC GRAVITY UA: 1.01 (ref 1–1.03)
URINE TYPE: ABNORMAL
UROBILINOGEN, URINE: 0.2 E.U./DL
WBC UA: 34 /HPF (ref 0–5)

## 2022-12-15 PROCEDURE — 77063 BREAST TOMOSYNTHESIS BI: CPT

## 2023-12-14 ENCOUNTER — TELEPHONE (OUTPATIENT)
Dept: ORTHOPEDIC SURGERY | Age: 78
End: 2023-12-14

## 2023-12-14 ENCOUNTER — OFFICE VISIT (OUTPATIENT)
Dept: ORTHOPEDIC SURGERY | Age: 78
End: 2023-12-14
Payer: MEDICARE

## 2023-12-14 VITALS — WEIGHT: 179 LBS | BODY MASS INDEX: 31.71 KG/M2 | HEIGHT: 63 IN

## 2023-12-14 DIAGNOSIS — M54.9 BACK PAIN, UNSPECIFIED BACK LOCATION, UNSPECIFIED BACK PAIN LATERALITY, UNSPECIFIED CHRONICITY: Primary | ICD-10-CM

## 2023-12-14 DIAGNOSIS — M47.816 LUMBAR SPONDYLOSIS: ICD-10-CM

## 2023-12-14 PROCEDURE — 99214 OFFICE O/P EST MOD 30 MIN: CPT | Performed by: PHYSICIAN ASSISTANT

## 2023-12-14 PROCEDURE — 1123F ACP DISCUSS/DSCN MKR DOCD: CPT | Performed by: PHYSICIAN ASSISTANT

## 2023-12-14 PROCEDURE — 1036F TOBACCO NON-USER: CPT | Performed by: PHYSICIAN ASSISTANT

## 2023-12-14 PROCEDURE — G8484 FLU IMMUNIZE NO ADMIN: HCPCS | Performed by: PHYSICIAN ASSISTANT

## 2023-12-14 PROCEDURE — G8400 PT W/DXA NO RESULTS DOC: HCPCS | Performed by: PHYSICIAN ASSISTANT

## 2023-12-14 PROCEDURE — 1090F PRES/ABSN URINE INCON ASSESS: CPT | Performed by: PHYSICIAN ASSISTANT

## 2023-12-14 PROCEDURE — G8427 DOCREV CUR MEDS BY ELIG CLIN: HCPCS | Performed by: PHYSICIAN ASSISTANT

## 2023-12-14 PROCEDURE — G8417 CALC BMI ABV UP PARAM F/U: HCPCS | Performed by: PHYSICIAN ASSISTANT

## 2023-12-14 RX ORDER — TRAMADOL HYDROCHLORIDE 50 MG/1
50 TABLET ORAL EVERY 6 HOURS PRN
Qty: 28 TABLET | Refills: 0 | Status: SHIPPED | OUTPATIENT
Start: 2023-12-14 | End: 2023-12-21

## 2023-12-14 SDOH — HEALTH STABILITY: PHYSICAL HEALTH: ON AVERAGE, HOW MANY DAYS PER WEEK DO YOU ENGAGE IN MODERATE TO STRENUOUS EXERCISE (LIKE A BRISK WALK)?: 2 DAYS

## 2023-12-14 SDOH — HEALTH STABILITY: PHYSICAL HEALTH: ON AVERAGE, HOW MANY MINUTES DO YOU ENGAGE IN EXERCISE AT THIS LEVEL?: 30 MIN

## 2023-12-14 NOTE — TELEPHONE ENCOUNTER
Appointment Request     Patient requesting earlier appointment: No  Appointment offered to patient: N/A  Patient Contact Number: [phone]    PATIENT IS CALLING IN TO BE SEEN TODAY AROUND 4 FOR BACK PAIN

## 2023-12-15 NOTE — PROGRESS NOTES
weakness, or onset/ worsening leg symptoms. The total time spent on today's visit including reviewing test results, history, performance of physical exam, counseling/ education, ordering of medications, tests or procedures was 35 minutes. This time does include completion of the medical record. This time excludes any time spent performing procedures or tests in the office. Kem Mayogra PA-C   Senior Physician Assistant   [facility]                                         Disclaimer: This note was generated with use of a verbal recognition program (DRAGON) and an attempt was made to check for errors. It is possible that there are still dictated errors within this office note. If so, please bring any significant errors to my attention for an addendum. All efforts were made to ensure that this office note is accurate.

## 2023-12-16 ENCOUNTER — HOSPITAL ENCOUNTER (OUTPATIENT)
Dept: MRI IMAGING | Age: 78
Discharge: HOME | End: 2023-12-16
Payer: MEDICARE

## 2023-12-16 DIAGNOSIS — M47.816 LUMBAR SPONDYLOSIS: ICD-10-CM

## 2023-12-16 PROCEDURE — 72148 MRI LUMBAR SPINE W/O DYE: CPT

## 2023-12-28 ENCOUNTER — HOSPITAL ENCOUNTER (OUTPATIENT)
Dept: WOMENS IMAGING | Age: 78
Discharge: HOME | End: 2023-12-28
Payer: MEDICARE

## 2023-12-28 DIAGNOSIS — Z12.31 VISIT FOR SCREENING MAMMOGRAM: ICD-10-CM

## 2023-12-28 PROCEDURE — 77063 BREAST TOMOSYNTHESIS BI: CPT

## 2024-01-02 ENCOUNTER — TELEPHONE (OUTPATIENT)
Dept: ORTHOPEDIC SURGERY | Age: 79
End: 2024-01-02

## 2024-01-02 NOTE — TELEPHONE ENCOUNTER
[facility]   Fx  [phone]  Called and wanted the xray report faxed over to her office .[facility] has the images just not the report

## 2024-01-04 ENCOUNTER — HOSPITAL ENCOUNTER (OUTPATIENT)
Age: 79
Discharge: HOME | End: 2024-01-04
Payer: MEDICARE

## 2024-01-04 ENCOUNTER — HOSPITAL ENCOUNTER (OUTPATIENT)
Dept: GENERAL RADIOLOGY | Age: 79
Discharge: HOME | End: 2024-01-04
Payer: MEDICARE

## 2024-01-04 PROCEDURE — 72110 X-RAY EXAM L-2 SPINE 4/>VWS: CPT

## 2024-06-10 ENCOUNTER — HOSPITAL ENCOUNTER (EMERGENCY)
Age: 79
Discharge: HOME | End: 2024-06-10
Attending: EMERGENCY MEDICINE
Payer: MEDICARE

## 2024-06-10 ENCOUNTER — APPOINTMENT (OUTPATIENT)
Dept: GENERAL RADIOLOGY | Age: 79
End: 2024-06-10
Payer: MEDICARE

## 2024-06-10 VITALS
HEART RATE: 62 BPM | TEMPERATURE: 98.9 F | HEIGHT: 62 IN | DIASTOLIC BLOOD PRESSURE: 72 MMHG | SYSTOLIC BLOOD PRESSURE: 121 MMHG | OXYGEN SATURATION: 97 % | WEIGHT: 192.24 LBS | BODY MASS INDEX: 35.38 KG/M2 | RESPIRATION RATE: 16 BRPM

## 2024-06-10 DIAGNOSIS — S92.515A CLOSED NONDISPLACED FRACTURE OF PROXIMAL PHALANX OF LESSER TOE OF LEFT FOOT, INITIAL ENCOUNTER: Primary | ICD-10-CM

## 2024-06-10 PROCEDURE — 73630 X-RAY EXAM OF FOOT: CPT

## 2024-06-10 PROCEDURE — 99283 EMERGENCY DEPT VISIT LOW MDM: CPT

## 2024-06-10 ASSESSMENT — PAIN - FUNCTIONAL ASSESSMENT
PAIN_FUNCTIONAL_ASSESSMENT: PREVENTS OR INTERFERES SOME ACTIVE ACTIVITIES AND ADLS
PAIN_FUNCTIONAL_ASSESSMENT: 0-10
PAIN_FUNCTIONAL_ASSESSMENT: NONE - DENIES PAIN

## 2024-06-10 ASSESSMENT — PAIN DESCRIPTION - PAIN TYPE: TYPE: ACUTE PAIN

## 2024-06-10 ASSESSMENT — PAIN SCALES - GENERAL
PAINLEVEL_OUTOF10: 8
PAINLEVEL_OUTOF10: 0

## 2024-06-10 ASSESSMENT — PAIN DESCRIPTION - DESCRIPTORS: DESCRIPTORS: ACHING

## 2024-06-10 ASSESSMENT — PAIN DESCRIPTION - LOCATION: LOCATION: TOE (COMMENT WHICH ONE)

## 2024-06-10 ASSESSMENT — PAIN DESCRIPTION - ORIENTATION: ORIENTATION: LEFT

## 2024-06-10 ASSESSMENT — LIFESTYLE VARIABLES
HOW MANY STANDARD DRINKS CONTAINING ALCOHOL DO YOU HAVE ON A TYPICAL DAY: PATIENT DOES NOT DRINK
HOW OFTEN DO YOU HAVE A DRINK CONTAINING ALCOHOL: NEVER

## 2024-06-10 NOTE — ED PROVIDER NOTES
HPI.  Six other systems were reviewed and are negative.  Nursing notes pertaining to ROS were reviewed.          PHYSICAL EXAM   /72   Pulse 62   Temp 98.9 °F (37.2 °C)   Resp 16   Ht 1.575 m (5' 2\")   Wt 87.2 kg (192 lb 3.9 oz)   SpO2 97%   BMI 35.16 kg/m²   GENERAL APPEARANCE: Awake and alert. Cooperative. No acute distress.  HEAD: Normocephalic. Atraumatic.  EYES: PERRL. EOM's grossly intact. No scleral icterus, injection or exudate  ENT: Mucous membranes are moist.  EXTREMITIES: Left leg reveals no tenderness over the ankle, Achilles, heel, navicular, base of fifth metatarsal or forefoot.  Patient has point tenderness over the fourth digit particularly over the fourth MTP joint that has some mild bruising is mildly angled to the lateral aspect of the foot.  No evidence of rotational deformity.  Normal sensation to light touch of all digits with good cap refill of all digits  SKIN: Warm and dry.    NEUROLOGICAL: Alert and oriented.     RADIOLOGY    XR FOOT LEFT (MIN 3 VIEWS)   Final Result   Obliquely oriented minimally displaced fracture of the proximal shaft of the   4th proximal phalanx.      Suspected nondisplaced fracture of the distal shaft of the 5th proximal   phalanx.                 ED COURSE/MDM  Left fourth/fifth toe proximal phalanx fracture: Buddy tape, postop shoe, RICE, ibuprofen or Tylenol as needed with orthopedic/podiatry follow-up within 3 to 5 days.    Hypertension:  Patient was hypertensive during the ER visit today.  There are no signs of hypertensive emergency or evidence of end organ damage by history or physical exam.  These findings were discussed with the patient and advised to follow up with primary care physician to further assess and treat hypertension in the outpatient setting.      The patient's blood pressure was found to be elevated according to CMS/Medicare and the Affordable Care Act/ObamaCare criteria. Elevated blood pressure could occur because of pain or

## 2024-06-11 NOTE — ED NOTES
Left 3rd and 4th toes nba taped together, gauze placed in between toes.  Medium post op shoe applied.  Patient education provided.  Patient verbalized understanding.  She tolerated well.

## 2024-06-27 ENCOUNTER — APPOINTMENT (OUTPATIENT)
Dept: CT IMAGING | Age: 79
End: 2024-06-27
Payer: MEDICARE

## 2024-06-27 ENCOUNTER — HOSPITAL ENCOUNTER (EMERGENCY)
Age: 79
Discharge: HOME | End: 2024-06-27
Attending: EMERGENCY MEDICINE
Payer: MEDICARE

## 2024-06-27 VITALS
HEIGHT: 63 IN | HEART RATE: 68 BPM | SYSTOLIC BLOOD PRESSURE: 133 MMHG | RESPIRATION RATE: 18 BRPM | DIASTOLIC BLOOD PRESSURE: 73 MMHG | WEIGHT: 200 LBS | TEMPERATURE: 98.1 F | BODY MASS INDEX: 35.44 KG/M2 | OXYGEN SATURATION: 97 %

## 2024-06-27 DIAGNOSIS — S39.012A STRAIN OF LUMBAR REGION, INITIAL ENCOUNTER: Primary | ICD-10-CM

## 2024-06-27 PROCEDURE — 96372 THER/PROPH/DIAG INJ SC/IM: CPT

## 2024-06-27 PROCEDURE — 6360000002 HC RX W HCPCS: Performed by: EMERGENCY MEDICINE

## 2024-06-27 PROCEDURE — 99284 EMERGENCY DEPT VISIT MOD MDM: CPT

## 2024-06-27 PROCEDURE — 72131 CT LUMBAR SPINE W/O DYE: CPT

## 2024-06-27 PROCEDURE — 6370000000 HC RX 637 (ALT 250 FOR IP): Performed by: EMERGENCY MEDICINE

## 2024-06-27 RX ORDER — HYDROCODONE BITARTRATE AND ACETAMINOPHEN 5; 325 MG/1; MG/1
1 TABLET ORAL ONCE
Status: COMPLETED | OUTPATIENT
Start: 2024-06-27 | End: 2024-06-27

## 2024-06-27 RX ORDER — LIDOCAINE 50 MG/G
1 PATCH TOPICAL DAILY
Qty: 10 PATCH | Refills: 0 | Status: SHIPPED | OUTPATIENT
Start: 2024-06-27 | End: 2024-07-07

## 2024-06-27 RX ORDER — CYCLOBENZAPRINE HCL 5 MG
5 TABLET ORAL 3 TIMES DAILY PRN
Qty: 21 TABLET | Refills: 0 | Status: SHIPPED | OUTPATIENT
Start: 2024-06-27 | End: 2024-07-04

## 2024-06-27 RX ORDER — KETOROLAC TROMETHAMINE 15 MG/ML
15 INJECTION, SOLUTION INTRAMUSCULAR; INTRAVENOUS ONCE
Status: COMPLETED | OUTPATIENT
Start: 2024-06-27 | End: 2024-06-27

## 2024-06-27 RX ORDER — IBUPROFEN 400 MG/1
400 TABLET ORAL EVERY 8 HOURS PRN
Qty: 21 TABLET | Refills: 0 | Status: SHIPPED | OUTPATIENT
Start: 2024-06-27 | End: 2024-07-04

## 2024-06-27 RX ORDER — LIDOCAINE 4 G/G
1 PATCH TOPICAL DAILY
Status: DISCONTINUED | OUTPATIENT
Start: 2024-06-27 | End: 2024-06-27 | Stop reason: HOSPADM

## 2024-06-27 RX ORDER — ORPHENADRINE CITRATE 30 MG/ML
60 INJECTION INTRAMUSCULAR; INTRAVENOUS ONCE
Status: COMPLETED | OUTPATIENT
Start: 2024-06-27 | End: 2024-06-27

## 2024-06-27 RX ADMIN — ORPHENADRINE CITRATE 60 MG: 60 INJECTION INTRAMUSCULAR; INTRAVENOUS at 15:16

## 2024-06-27 RX ADMIN — HYDROCODONE BITARTRATE AND ACETAMINOPHEN 1 TABLET: 5; 325 TABLET ORAL at 15:15

## 2024-06-27 RX ADMIN — KETOROLAC TROMETHAMINE 15 MG: 15 INJECTION, SOLUTION INTRAMUSCULAR; INTRAVENOUS at 15:17

## 2024-06-27 ASSESSMENT — LIFESTYLE VARIABLES
HOW OFTEN DO YOU HAVE A DRINK CONTAINING ALCOHOL: NEVER
HOW MANY STANDARD DRINKS CONTAINING ALCOHOL DO YOU HAVE ON A TYPICAL DAY: PATIENT DOES NOT DRINK

## 2024-06-27 ASSESSMENT — PAIN - FUNCTIONAL ASSESSMENT
PAIN_FUNCTIONAL_ASSESSMENT: 0-10
PAIN_FUNCTIONAL_ASSESSMENT: 0-10

## 2024-06-27 ASSESSMENT — PAIN SCALES - GENERAL
PAINLEVEL_OUTOF10: 10
PAINLEVEL_OUTOF10: 1

## 2024-06-27 ASSESSMENT — PAIN DESCRIPTION - DESCRIPTORS: DESCRIPTORS: DISCOMFORT

## 2024-06-27 ASSESSMENT — PAIN DESCRIPTION - ORIENTATION: ORIENTATION: LOWER

## 2024-06-27 ASSESSMENT — PAIN DESCRIPTION - LOCATION: LOCATION: BACK

## 2024-06-27 NOTE — ED PROVIDER NOTES
CHIEF COMPLAINT  Chief Complaint   Patient presents with    Fall    Back Pain     Reports being out in her yard / tripped and fell forward around noon and now her lower back hurts bad.        HISTORY OF PRESENT ILLNESS  Payton Guevara is a 78 y.o. female who presents to the ED complaining of 3-hour history of right lower back pain that started after she was in her yard and tripped and fell forward landing on her hands and knees but causing a pulling sensation into her low back.  Patient reports that she did not land on the back.  Patient reports no loss of bowel or bladder control.  No saddle paresthesias.  No abdominal pain.  No pelvis pain.  No hip pain.  Patient reports no radicular symptoms, paresthesias or leg weakness.  Patient reports that twisting or bending does reproduce the pain.    No other complaints, modifying factors or associated symptoms.     Nursing notes reviewed.   Past Medical History:   Diagnosis Date    Achalasia     Anemia     Arthritis     Breast cancer ([facility]) 2011    left breast mastectomy    Clostridium difficile infection 2/25/14    Diabetes mellitus ([facility])     diet controlled    HBP (high blood pressure)     Hearing impairment     Hyperlipidemia     Nausea & vomiting     PONV (postoperative nausea and vomiting)     Sinus disorder     Tremor of both hands     Wears glasses     Wears hearing aid     bilat    Weight disorder      Past Surgical History:   Procedure Laterality Date    BREAST LUMPECTOMY  2011    BREAST SURGERY  left masectomy    ESOPHAGUS SURGERY      HAND SURGERY Right     HYSTERECTOMY (CERVIX STATUS UNKNOWN)      JOINT REPLACEMENT  left knee replacement 2007    JOINT REPLACEMENT Right 1/31/14    Right total hip replacement    JOINT REPLACEMENT Left 12/2014    hip     JOINT REPLACEMENT Right     SHOULDER SURGERY      TONSILLECTOMY       Family History   Problem Relation Age of Onset    Arthritis Other     Cancer Other     Heart Disease Other     High Blood Pressure Other

## 2024-12-27 ENCOUNTER — HOSPITAL ENCOUNTER (OUTPATIENT)
Dept: PHYSICAL THERAPY | Age: 79
Setting detail: THERAPIES SERIES
Discharge: HOME | End: 2024-12-27
Payer: MEDICARE

## 2024-12-27 DIAGNOSIS — R26.2 DIFFICULTY WALKING: Primary | ICD-10-CM

## 2024-12-27 PROCEDURE — 97110 THERAPEUTIC EXERCISES: CPT | Performed by: PHYSICAL THERAPIST

## 2024-12-27 PROCEDURE — 97112 NEUROMUSCULAR REEDUCATION: CPT | Performed by: PHYSICAL THERAPIST

## 2024-12-27 PROCEDURE — 97161 PT EVAL LOW COMPLEX 20 MIN: CPT | Performed by: PHYSICAL THERAPIST

## 2024-12-27 NOTE — PLAN OF CARE
[facility]- Outpatient Rehabilitation and Therapy [address] [place] [address] office: [phone] fax: [phone]     Physical Therapy Initial Evaluation Certification      Dear Va Fuller MD ,    We had the pleasure of evaluating the following patient for physical therapy services at [facility].  A summary of our findings can be found in the initial assessment below.  This includes our plan of care.  If you have any questions or concerns regarding these findings, please do not hesitate to contact me at the office phone number listed above.  Thank you for the referral.     Physician Signature:_______________________________Date:__________________  By signing above (or electronic signature), therapist’s plan is approved by physician       Physical Therapy: TREATMENT/PROGRESS NOTE   Patient: Payton Guevara (79 y.o. female)   Examination Date: 2024   :  1945 MRN: [id number]   Visit #: 1   Insurance Allowable Auth Needed   medicare []Yes    [x]No    Insurance: Payor: MEDICARE / Plan: MEDICARE PART A AND B / Product Type: *No Product type* /   Insurance ID: [id number] - (Medicare)  Secondary Insurance (if applicable): [facility]   Treatment Diagnosis:     ICD-10-CM    1. Difficulty walking  R26.2          Medical Diagnosis:  Unsteadiness on feet [R26.81]   Referring Physician: Va Fuller MD  PCP: Annie Sabillon MD     Plan of care signed (Y/N): sent on 24    Date of Patient follow up with Physician: STEVIE     Plan of Care Report: EVAL today  POC update due: (10 visits /OR AUTH LIMITS, whichever is less)  2025                                             Medical History:  Comorbidities:  Diabetes (Type I or II)  Hypertension  Osteoarthritis  Relevant Medical History: ++ see enclosed                                         Precautions/ Contra-indications:           Latex allergy:  NO  Pacemaker:    NO  Contraindications for

## 2025-01-06 ENCOUNTER — HOSPITAL ENCOUNTER (OUTPATIENT)
Dept: PHYSICAL THERAPY | Age: 80
Setting detail: THERAPIES SERIES
End: 2025-01-06
Payer: MEDICARE

## 2025-01-08 ENCOUNTER — HOSPITAL ENCOUNTER (OUTPATIENT)
Dept: PHYSICAL THERAPY | Age: 80
Setting detail: THERAPIES SERIES
Discharge: HOME | End: 2025-01-08
Payer: MEDICARE

## 2025-01-08 PROCEDURE — 97110 THERAPEUTIC EXERCISES: CPT

## 2025-01-08 PROCEDURE — 97112 NEUROMUSCULAR REEDUCATION: CPT

## 2025-01-08 NOTE — FLOWSHEET NOTE
- 1 x daily - 7 x weekly - 1 sets - 3 reps - 15 seconds hold  - Seated Hip Adduction Isometrics with Ball  - 1 x daily - 7 x weekly - 1 sets - 10 reps - seconds hold  - Seated Knee Flexion Extension AROM   - 1 x daily - 7 x weekly - 1 sets - 10 reps  - Seated Hip Abduction with Pelvic Floor Contraction and Resistance Loop  - 1 x daily - 7 x weekly - 1 sets - 10 reps - 10 hold  - Seated March  - 1 x daily - 7 x weekly - 2-3 sets - 5 reps    ASSESSMENT   Assessment:   Payton Guevara is a 79 y.o. female presenting today to Outpatient PT with signs and symptoms consistent with moderate mm weakness and stiffness that are contributing to decreased stability and balance control. .    Pt. presents with the functional impairments and activity limitations listed below and would benefit from Outpatient PT to address the below impairments as well as improve pain, and restore function.     Physical Therapy Evaluation Complexity Justification  [x] A history of present problem and 3 or more personal factors and/or co-morbidities that impact the plan of care  [x] A total of 3 elements found upon examination of body systems using standardized tests and measures addressing any of the following: body structures, functions (impairments), activity limitations, and/or participation restrictions  [x] A clinical presentation with stable and/or uncomplicated characteristics   [x] Clinical decision making of LOW (10409 - Typically 20 minutes face-to-face) complexity using standardized patient assessment instrument and/or measurable assessment of functional outcome.    Today's Assessment: During therapy this date, patient required visual cueing, verbal cueing, tactile cueing, muscle facilitation, and progression of exercises and program for exercise progression, improving proper muscle recruitment and activation/motor control patterns, static and dynamic balance, and improving postural awareness.Patient will continue to benefit from ongoing

## 2025-01-13 ENCOUNTER — HOSPITAL ENCOUNTER (OUTPATIENT)
Dept: PHYSICAL THERAPY | Age: 80
Setting detail: THERAPIES SERIES
Discharge: HOME | End: 2025-01-13
Payer: MEDICARE

## 2025-01-13 PROCEDURE — 97112 NEUROMUSCULAR REEDUCATION: CPT

## 2025-01-13 PROCEDURE — 97110 THERAPEUTIC EXERCISES: CPT

## 2025-01-13 NOTE — FLOWSHEET NOTE
related to strengthening, flexibility, endurance, ROM performed to prevent loss of range of motion, maintain or improve muscular strength or increase flexibility, following either an injury or surgery.   (59994) NEUROMUSCULAR RE-EDUCATION - Therapeutic procedure, 1 or more areas, each 15 minutes; neuromuscular reeducation of movement, balance, coordination, kinesthetic sense, posture, and/or proprioception for sitting and/or standing activities    GOALS     Patient stated goal: be able to do light house work  [] Progressing: [] Met: [] Not Met: [] Adjusted    Therapist goals for Patient:   Short Term Goals: To be achieved in: 2 weeks  1. Independent in HEP and progression per patient tolerance, in order to prevent re-injury.   [] Progressing: [] Met: [] Not Met: [] Adjusted  2. Patient will have a decrease in pain to <4/10 to facilitate improvement in movement, function, and ADLs as indicated by Functional Deficits.  [] Progressing: [] Met: [] Not Met: [] Adjusted    IF APPLICABLE:  [] Patient to demonstrate independence in wear and care for custom orthotic device. (Only if applicable for orthotic eval)     Long Term Goals: To be achieved in: 4 weeks  1. Disability index score of 50% or less for the WOMAC to assist with reaching prior level of function with activities such as dressing; being able to put on socks.  [] Progressing: [] Met: [] Not Met: [] Adjusted  2. Patient will demonstrate increased AROM of BLE / lumbar to WFL without pain to allow for proper joint functioning to enable patient to bend forward to do light house work at waist height.  .   [] Progressing: [] Met: [] Not Met: [] Adjusted  3. Patient will demonstrate increased Strength of BLE to demonstrate improved muscle activation/motor control to allow for proper functional mobility to enable patient to return to able with appro A device while doing short shopping trips. .   [] Progressing: [] Met: [] Not Met: [] Adjusted  4. Patient will return to

## 2025-01-15 ENCOUNTER — HOSPITAL ENCOUNTER (OUTPATIENT)
Dept: PHYSICAL THERAPY | Age: 80
Setting detail: THERAPIES SERIES
Discharge: HOME | End: 2025-01-15
Payer: MEDICARE

## 2025-01-15 PROCEDURE — 97110 THERAPEUTIC EXERCISES: CPT

## 2025-01-15 PROCEDURE — 97112 NEUROMUSCULAR REEDUCATION: CPT

## 2025-01-15 NOTE — FLOWSHEET NOTE
and/or copied from initial evaluation, reassessments and prior notes for documentation efficiency.    Note: If patient does not return for scheduled/recommended follow up visits, this note will serve as a discharge from care along with the most recent update on progress.

## 2025-01-20 ENCOUNTER — HOSPITAL ENCOUNTER (OUTPATIENT)
Dept: PHYSICAL THERAPY | Age: 80
Setting detail: THERAPIES SERIES
Discharge: HOME | End: 2025-01-20
Payer: MEDICARE

## 2025-01-20 PROCEDURE — 97112 NEUROMUSCULAR REEDUCATION: CPT

## 2025-01-20 PROCEDURE — 97110 THERAPEUTIC EXERCISES: CPT

## 2025-01-20 NOTE — FLOWSHEET NOTE
[facility]- Outpatient Rehabilitation and Therapy [address] [place] [address] office: [phone] fax: [phone]         Physical Therapy: TREATMENT/PROGRESS NOTE   Patient: Payton Guevara (79 y.o. female)   Examination Date: 2025   :  1945 MRN: [id number]   Visit #: 5   Insurance Allowable Auth Needed   medicare []Yes    [x]No    Insurance: Payor: MEDICARE / Plan: MEDICARE PART A AND B / Product Type: *No Product type* /   Insurance ID: [id number] - (Medicare)  Secondary Insurance (if applicable): [facility]   Treatment Diagnosis:       ICD-10-CM      1. Difficulty walking  R26.2              Medical Diagnosis:  Unsteadiness on feet [R26.81]   Referring Physician: Va Fuller MD  PCP: Annie Sabillon MD     Plan of care signed (Y/N): sent on 24    Date of Patient follow up with Physician: STEVIE     Plan of Care Report: NO  POC update due: (10 visits /OR AUTH LIMITS, whichever is less)  2025                                             Medical History:  Comorbidities:  Diabetes (Type I or II)  Hypertension  Osteoarthritis  Relevant Medical History: ++ see enclosed                                         Precautions/ Contra-indications:           Latex allergy:  NO  Pacemaker:    NO  Contraindications for Manipulation: None  Date of Surgery: n/a   Other:    Red Flags:  None    Suicide Screening:   The patient did not verbalize a primary behavioral concern, suicidal ideation, suicidal intent, or demonstrate suicidal behaviors.    Preferred Language for Healthcare:   [x] English       [] other:    SUBJECTIVE EXAMINATION     Patient stated complaint: The patient has long history of back and hip pain/pathology. She has been falling recently and last fall was right before . She was on the hospital for several days. She did not have PT while in the hospital. She indicated that her legs feel weak and heavy. 5 to 6 / 10 pain to LB, right hip and BLE

## 2025-01-22 ENCOUNTER — HOSPITAL ENCOUNTER (OUTPATIENT)
Dept: PHYSICAL THERAPY | Age: 80
Setting detail: THERAPIES SERIES
Discharge: HOME | End: 2025-01-22
Payer: MEDICARE

## 2025-01-22 PROCEDURE — 97112 NEUROMUSCULAR REEDUCATION: CPT

## 2025-01-22 PROCEDURE — 97110 THERAPEUTIC EXERCISES: CPT

## 2025-01-22 NOTE — FLOWSHEET NOTE
[facility] [address] [place] [address] office: [phone] fax: [phone]         Physical Therapy: TREATMENT/PROGRESS NOTE   Patient: Payton Guevara (79 y.o. female)   Examination Date: 2025   :  1945 MRN: [id number]   Visit #: 6   Insurance Allowable Auth Needed   medicare []Yes    [x]No    Insurance: Payor: MEDICARE / Plan: MEDICARE PART A AND B / Product Type: *No Product type* /   Insurance ID: [id number] - (Medicare)  Secondary Insurance (if applicable): [facility]   Treatment Diagnosis:       ICD-10-CM      1. Difficulty walking  R26.2              Medical Diagnosis:  Unsteadiness on feet [R26.81]   Referring Physician: Va Fuller MD  PCP: Annie Sabillon MD     Plan of care signed (Y/N): sent on 24    Date of Patient follow up with Physician: STEVIE     Plan of Care Report: NO  POC update due: (10 visits /OR AUTH LIMITS, whichever is less)  2025                                             Medical History:  Comorbidities:  Diabetes (Type I or II)  Hypertension  Osteoarthritis  Relevant Medical History: ++ see enclosed                                         Precautions/ Contra-indications:           Latex allergy:  NO  Pacemaker:    NO  Contraindications for Manipulation: None  Date of Surgery: n/a   Other:    Red Flags:  None    Suicide Screening:   The patient did not verbalize a primary behavioral concern, suicidal ideation, suicidal intent, or demonstrate suicidal behaviors.    Preferred Language for Healthcare:   [x] English       [] other:    SUBJECTIVE EXAMINATION     Patient stated complaint: The patient has long history of back and hip pain/pathology. She has been falling recently and last fall was right before . She was on the hospital for several days. She did not have PT while in the hospital. She indicated that her legs feel weak and heavy. 5 to 6 / 10 pain to LB, right hip and BLE

## 2025-01-27 ENCOUNTER — HOSPITAL ENCOUNTER (OUTPATIENT)
Dept: PHYSICAL THERAPY | Age: 80
Setting detail: THERAPIES SERIES
Discharge: HOME | End: 2025-01-27
Payer: MEDICARE

## 2025-01-27 PROCEDURE — 97110 THERAPEUTIC EXERCISES: CPT

## 2025-01-27 PROCEDURE — 97112 NEUROMUSCULAR REEDUCATION: CPT

## 2025-01-27 NOTE — FLOWSHEET NOTE
[facility]- Outpatient Rehabilitation and Therapy [address] [place] [address] office: [phone] fax: [phone]         Physical Therapy: TREATMENT/PROGRESS NOTE   Patient: Payton Guevara (79 y.o. female)   Examination Date: 2025   :  1945 MRN: [id number]   Visit #: 7   Insurance Allowable Auth Needed   medicare []Yes    [x]No    Insurance: Payor: MEDICARE / Plan: MEDICARE PART A AND B / Product Type: *No Product type* /   Insurance ID: [id number] - (Medicare)  Secondary Insurance (if applicable): [facility]   Treatment Diagnosis:       ICD-10-CM      1. Difficulty walking  R26.2              Medical Diagnosis:  Unsteadiness on feet [R26.81]   Referring Physician: Va Fuller MD  PCP: Annie Sabillon MD     Plan of care signed (Y/N): sent on 24    Date of Patient follow up with Physician: STEVIE     Plan of Care Report: NO  POC update due: (10 visits /OR AUTH LIMITS, whichever is less)  2025                                             Medical History:  Comorbidities:  Diabetes (Type I or II)  Hypertension  Osteoarthritis  Relevant Medical History: ++ see enclosed                                         Precautions/ Contra-indications:           Latex allergy:  NO  Pacemaker:    NO  Contraindications for Manipulation: None  Date of Surgery: n/a   Other:    Red Flags:  None    Suicide Screening:   The patient did not verbalize a primary behavioral concern, suicidal ideation, suicidal intent, or demonstrate suicidal behaviors.    Preferred Language for Healthcare:   [x] English       [] other:    SUBJECTIVE EXAMINATION     Patient stated complaint: The patient has long history of back and hip pain/pathology. She has been falling recently and last fall was right before . She was on the hospital for several days. She did not have PT while in the hospital. She indicated that her legs feel weak and heavy. 5 to 6 / 10 pain to LB, right hip and BLE

## 2025-01-29 ENCOUNTER — HOSPITAL ENCOUNTER (OUTPATIENT)
Dept: PHYSICAL THERAPY | Age: 80
Setting detail: THERAPIES SERIES
Discharge: HOME | End: 2025-01-29
Payer: MEDICARE

## 2025-01-29 PROCEDURE — 97530 THERAPEUTIC ACTIVITIES: CPT

## 2025-01-29 PROCEDURE — 97110 THERAPEUTIC EXERCISES: CPT

## 2025-01-29 PROCEDURE — 97112 NEUROMUSCULAR REEDUCATION: CPT

## 2025-01-29 NOTE — FLOWSHEET NOTE
R  1/20/25       HIP Flex (120) 55 50 80 95    Abd (45)        ER (50)        IR (45)        Ext (20)       KNEE Flex (140) 55 45 95 (sitting) 95 (sitting)    Ext (0) -4 -5             MMT L  12/27/25 MMT R  12/27/25 MMT L  1/20/25 MMT R  1/20/25       HIP  Flexion 4 4-       Abduction         ER         IR         Extension       KNEE  Flexion 4 4 4+ 4+     Extension 4 4 4+ 4+     12/27/24  Palpation:   Patient reported tenderness with palpation  Location:Bilateral lumbar paraspinals / RLE popliteal area mild / R Hip glut / piriformis.   Posture:   forward head, forward trunk, and rounded shoulders  Gait:    Pattern: antalgic pattern and decreased step length  Assistive Device Used: Rolling walker (RW)  Time Up and Go (TUG):   Not Assessed   1/13/25  TUG: with rollator 20 seconds; without A device 44 seconds  Torres Balance Assessment: 28/56       Exercises/Interventions      Position Sets/sec Reps Notes/CUES   Nustep L1 5'               Calf raises  2 10    Standing hip ABD L/R  Standing hip EXT     Sitting trunk flexion, hands on green swiss ball     20    SANDER  Knee extension  Knee flexion 8/9   5\"  5\"   10  10    LAQ L/R 2# 2 10    Sitting hip flexion L/R 2# 2 10    HS curls  T-band green  20                                       Balance activities              Tandem balance  30\" 4    Airex- all with CGA  Rhythmic stab  Trunk rotation with ball hand off L/R  Toe taps           4'  15  20     Small kick ball   Step ups from Airex to 6\" step R LE  L LE 2  2 10  10    Ambulate up/down 4 steps   3           Vitals (after Nu Step)  BP  Resting HR Use R UE d/t PMH L mastectomy    1/29/25  128/54  77                   Modalities:    No modalities applied this session    Education/Home Exercise Program:   Access Code: [email or web address]  URL: [email or web address]  Date: 01/08/2025  Prepared by: Kenisha Lai    Exercises  - Long Sitting Ankle Pumps  - 1 x daily - 7 x weekly - 1 sets - 20 reps  - Long Sitting Quad Set  - 1

## 2025-02-03 ENCOUNTER — HOSPITAL ENCOUNTER (OUTPATIENT)
Dept: PHYSICAL THERAPY | Age: 80
Setting detail: THERAPIES SERIES
Discharge: HOME | End: 2025-02-03
Payer: MEDICARE

## 2025-02-03 PROCEDURE — 97530 THERAPEUTIC ACTIVITIES: CPT

## 2025-02-03 PROCEDURE — 97112 NEUROMUSCULAR REEDUCATION: CPT

## 2025-02-03 PROCEDURE — 97110 THERAPEUTIC EXERCISES: CPT

## 2025-02-03 NOTE — FLOWSHEET NOTE
[facility] [address] [place] [address] office: [phone] fax: [phone]         Physical Therapy: TREATMENT/PROGRESS NOTE   Patient: Payton Guevara (79 y.o. female)   Examination Date: 2025   :  1945 MRN: [id number]   Visit #: 9   Insurance Allowable Auth Needed   medicare []Yes    [x]No    Insurance: Payor: MEDICARE / Plan: MEDICARE PART A AND B / Product Type: *No Product type* /   Insurance ID: [id number] - (Medicare)  Secondary Insurance (if applicable): [facility]   Treatment Diagnosis:       ICD-10-CM      1. Difficulty walking  R26.2              Medical Diagnosis:  Unsteadiness on feet [R26.81]   Referring Physician: Va Fuller MD  PCP: Annie Sabillon MD     Plan of care signed (Y/N): sent on 24    Date of Patient follow up with Physician: STEVIE     Plan of Care Report: NO  POC update due: (10 visits /OR AUTH LIMITS, whichever is less)  2025                                             Medical History:  Comorbidities:  Diabetes (Type I or II)  Hypertension  Osteoarthritis  Relevant Medical History: ++ see enclosed                                         Precautions/ Contra-indications:           Latex allergy:  NO  Pacemaker:    NO  Contraindications for Manipulation: None  Date of Surgery: n/a   Other:    Red Flags:  None    Suicide Screening:   The patient did not verbalize a primary behavioral concern, suicidal ideation, suicidal intent, or demonstrate suicidal behaviors.    Preferred Language for Healthcare:   [x] English       [] other:    SUBJECTIVE EXAMINATION     Patient stated complaint: The patient has long history of back and hip pain/pathology. She has been falling recently and last fall was right before . She was on the hospital for several days. She did not have PT while in the hospital. She indicated that her legs feel weak and heavy. 5 to 6 / 10 pain to LB, right hip and BLE

## 2025-02-05 ENCOUNTER — HOSPITAL ENCOUNTER (OUTPATIENT)
Dept: PHYSICAL THERAPY | Age: 80
Setting detail: THERAPIES SERIES
Discharge: HOME | End: 2025-02-05
Payer: MEDICARE

## 2025-02-05 PROCEDURE — 97110 THERAPEUTIC EXERCISES: CPT

## 2025-02-05 PROCEDURE — 97112 NEUROMUSCULAR REEDUCATION: CPT

## 2025-02-05 PROCEDURE — 97530 THERAPEUTIC ACTIVITIES: CPT

## 2025-02-05 NOTE — FLOWSHEET NOTE
Step)  BP  Resting HR Use R UE d/t PMH L mastectomy    2/5/25  101/50  69                   Modalities:    No modalities applied this session    Education/Home Exercise Program:   Access Code: [email or web address]  URL: [email or web address]  Date: 01/08/2025  Prepared by: Kenisha Lai    Exercises  - Long Sitting Ankle Pumps  - 1 x daily - 7 x weekly - 1 sets - 20 reps  - Long Sitting Quad Set  - 1 x daily - 7 x weekly - 1 sets - 10 reps - 5 seconds hold  - Long Sitting Calf Stretch with Strap  - 1 x daily - 7 x weekly - 1 sets - 3 reps - 15 seconds hold  - Seated Hip Adduction Isometrics with Ball  - 1 x daily - 7 x weekly - 1 sets - 10 reps - seconds hold  - Seated Knee Flexion Extension AROM   - 1 x daily - 7 x weekly - 1 sets - 10 reps  - Seated Hip Abduction with Pelvic Floor Contraction and Resistance Loop  - 1 x daily - 7 x weekly - 1 sets - 10 reps - 10 hold  - Seated March  - 1 x daily - 7 x weekly - 2-3 sets - 5 reps    ASSESSMENT     Today's Assessment: During therapy this date, patient required visual cueing, verbal cueing, tactile cueing, muscle facilitation, and progression of exercises and program for exercise progression, improving proper muscle recruitment and activation/motor control patterns, static and dynamic balance, and improving postural awareness.Patient will continue to benefit from ongoing evaluation and advanced clinical decision from a Physical Therapist to address and improve pain control, ROM, muscle strength, neuromuscular control, normalization of gait, and balance and proprioception to safely return to PLOF without symptoms or restrictions.  1/8/25: assistance lifting L LE up onto machines due to hip flexor weakness. Updated HEP  1/20/25 hip became stiff and fatigued toward the end of step to drill.  1/22/25 tolerated step up drills well today.  Circumducted L LE when walking up stairs.  1/29: LOB x1 with ball pass on airex. Increased L hip pain with hip flexion  2/3/25 pt is making

## 2025-02-20 NOTE — PROGRESS NOTES
[facility]  Advanced CHF/Pulmonary Hypertension   Cardiac Evaluation      Payton Guevara  YOB: 1945    Date of Visit:  2/21/25    Chief Complaint   Patient presents with    New Patient    Shortness of Breath     orthopnea    Cough    Fatigue    Dizziness    Edema    Nausea      History of Present Illness:  Payton Guevara is a 79 y.o. female who presents as a new patient for consultation and management of diastolic heart failure. Her history includes HTN, HLD, DM, breast cancer in 2011 with left mastectomy (took oral chemo x5 years post op). She is not a smoker.    She was told she had Parkinsons (h/o falls) but then was diagnosed with RLS and not Parkinson's.    She was admitted to [facility] on 11/7/2024 with recurrent falls at home and progressive bilateral leg weakness. She had fallen twice that week, fell that morning. Reported some R hip pain.  of pt reported slurred speech x1 week and being more forgetful than usual. She was told to come in by home health nurse that heard crackles in her lungs. Reported increased SOB, wears 2L O2 at night. Initial CT of the brain, CTA of the head and neck show no acute hemorrhage, brain mass, large infarct, large vessel obstruction. Had persistent unsteady gait, bilateral weakness in hamstring, gluteal mm. Assessed by stroke team and MRI completed. There was no evidence of acute stroke on any of her brain imaging. She worked with PT and OT and was determined to be strong enough to return home without stopping at SNF. Home PT and outpatient follow up with her PCP was recommended.  Was told to come in by home health nurse that heard crackles in her lungs. Reports increased SOB.    She used to wear 2L O2 at night but stated it did not help.    She reports she had a sleep study, and it was negative for sleep apnea.    Today, she is here for a second opinion. She reports she has had ongoing SOB for \"awhile.\" She did not know she had \"heart failure\"

## 2025-02-21 ENCOUNTER — HOSPITAL ENCOUNTER (OUTPATIENT)
Age: 80
Discharge: HOME | End: 2025-02-21
Payer: MEDICARE

## 2025-02-21 ENCOUNTER — OFFICE VISIT (OUTPATIENT)
Dept: CARDIOLOGY CLINIC | Age: 80
End: 2025-02-21

## 2025-02-21 VITALS
OXYGEN SATURATION: 99 % | DIASTOLIC BLOOD PRESSURE: 56 MMHG | HEART RATE: 60 BPM | WEIGHT: 168 LBS | SYSTOLIC BLOOD PRESSURE: 148 MMHG | HEIGHT: 63 IN | BODY MASS INDEX: 29.77 KG/M2

## 2025-02-21 DIAGNOSIS — I10 ESSENTIAL HYPERTENSION, BENIGN: ICD-10-CM

## 2025-02-21 DIAGNOSIS — I50.32 CHRONIC DIASTOLIC HEART FAILURE (HCC): Primary | ICD-10-CM

## 2025-02-21 DIAGNOSIS — R06.02 SHORTNESS OF BREATH: ICD-10-CM

## 2025-02-21 DIAGNOSIS — N28.9 RENAL INSUFFICIENCY: ICD-10-CM

## 2025-02-21 DIAGNOSIS — E78.00 PURE HYPERCHOLESTEROLEMIA: ICD-10-CM

## 2025-02-21 DIAGNOSIS — E55.9 VITAMIN D DEFICIENCY: ICD-10-CM

## 2025-02-21 DIAGNOSIS — I50.32 CHRONIC DIASTOLIC HEART FAILURE (HCC): ICD-10-CM

## 2025-02-21 LAB
25(OH)D3 SERPL-MCNC: 32 NG/ML
ANION GAP SERPL CALCULATED.3IONS-SCNC: 13 MMOL/L (ref 3–16)
BUN SERPL-MCNC: 30 MG/DL (ref 7–20)
CALCIUM SERPL-MCNC: 10.2 MG/DL (ref 8.3–10.6)
CHLORIDE SERPL-SCNC: 102 MMOL/L (ref 99–110)
CHOLEST SERPL-MCNC: 144 MG/DL (ref 0–199)
CO2 SERPL-SCNC: 27 MMOL/L (ref 21–32)
CREAT SERPL-MCNC: 1.5 MG/DL (ref 0.6–1.2)
DEPRECATED RDW RBC AUTO: 12.7 % (ref 12.4–15.4)
FERRITIN SERPL IA-MCNC: 54.4 NG/ML (ref 15–150)
GFR SERPLBLD CREATININE-BSD FMLA CKD-EPI: 36 ML/MIN/{1.73_M2}
GLUCOSE SERPL-MCNC: 115 MG/DL (ref 70–99)
HCT VFR BLD AUTO: 37.1 % (ref 36–48)
HDLC SERPL-MCNC: 48 MG/DL (ref 40–60)
HGB BLD-MCNC: 12.5 G/DL (ref 12–16)
IRON SATN MFR SERPL: 25 % (ref 15–50)
IRON SERPL-MCNC: 74 UG/DL (ref 37–145)
LDLC SERPL CALC-MCNC: 81 MG/DL
MCH RBC QN AUTO: 29.3 PG (ref 26–34)
MCHC RBC AUTO-ENTMCNC: 33.7 G/DL (ref 31–36)
MCV RBC AUTO: 86.9 FL (ref 80–100)
NT-PROBNP SERPL-MCNC: 195 PG/ML (ref 0–449)
PLATELET # BLD AUTO: 233 K/UL (ref 135–450)
PMV BLD AUTO: 8 FL (ref 5–10.5)
POTASSIUM SERPL-SCNC: 4.1 MMOL/L (ref 3.5–5.1)
RBC # BLD AUTO: 4.27 M/UL (ref 4–5.2)
SODIUM SERPL-SCNC: 142 MMOL/L (ref 136–145)
TIBC SERPL-MCNC: 291 UG/DL (ref 260–445)
TRIGL SERPL-MCNC: 73 MG/DL (ref 0–150)
TSH SERPL DL<=0.005 MIU/L-ACNC: 2.42 UIU/ML (ref 0.27–4.2)
VLDLC SERPL CALC-MCNC: 15 MG/DL
WBC # BLD AUTO: 6.2 K/UL (ref 4–11)

## 2025-02-21 PROCEDURE — 83550 IRON BINDING TEST: CPT

## 2025-02-21 PROCEDURE — 82728 ASSAY OF FERRITIN: CPT

## 2025-02-21 PROCEDURE — 84443 ASSAY THYROID STIM HORMONE: CPT

## 2025-02-21 PROCEDURE — 80048 BASIC METABOLIC PNL TOTAL CA: CPT

## 2025-02-21 PROCEDURE — 82306 VITAMIN D 25 HYDROXY: CPT

## 2025-02-21 PROCEDURE — 85027 COMPLETE CBC AUTOMATED: CPT

## 2025-02-21 PROCEDURE — 83540 ASSAY OF IRON: CPT

## 2025-02-21 PROCEDURE — 83880 ASSAY OF NATRIURETIC PEPTIDE: CPT

## 2025-02-21 PROCEDURE — 36415 COLL VENOUS BLD VENIPUNCTURE: CPT

## 2025-02-21 PROCEDURE — 80061 LIPID PANEL: CPT

## 2025-02-21 RX ORDER — TORSEMIDE 20 MG/1
20 TABLET ORAL 2 TIMES DAILY
COMMUNITY

## 2025-02-21 RX ORDER — SACUBITRIL AND VALSARTAN 24; 26 MG/1; MG/1
TABLET, FILM COATED ORAL
Qty: 90 TABLET | Refills: 3
Start: 2025-02-21 | End: 2025-02-21 | Stop reason: SDUPTHER

## 2025-02-21 RX ORDER — M-VIT,TX,IRON,MINS/CALC/FOLIC 27MG-0.4MG
1 TABLET ORAL DAILY
COMMUNITY

## 2025-02-21 RX ORDER — GABAPENTIN 300 MG/1
300 CAPSULE ORAL 3 TIMES DAILY
COMMUNITY

## 2025-02-21 RX ORDER — SACUBITRIL AND VALSARTAN 24; 26 MG/1; MG/1
TABLET, FILM COATED ORAL
Qty: 28 TABLET | Refills: 0 | Status: SHIPPED | COMMUNITY
Start: 2025-02-21

## 2025-02-21 RX ORDER — TOPIRAMATE 50 MG/1
50 TABLET, FILM COATED ORAL 2 TIMES DAILY
COMMUNITY

## 2025-02-21 RX ORDER — INSULIN GLARGINE 100 [IU]/ML
9 INJECTION, SOLUTION SUBCUTANEOUS NIGHTLY
COMMUNITY

## 2025-02-21 NOTE — PATIENT INSTRUCTIONS
Stop losartan (last dose today 2/21/25).    Begin Entresto 24-26mg 1/2 tab twice a day (start tomorrow 2/22/225).    Lab work March 10th at the [facility] lab ([facility]).    Weigh every morning & record.    Call the office for any new, worsening, or concerning symptoms.    [facility] (their office should call you to schedule. Feel free to call them next week as well):  ([facility])  [address], [address], [place] [address] [phone]

## 2025-02-25 ENCOUNTER — TELEPHONE (OUTPATIENT)
Dept: CARDIOLOGY CLINIC | Age: 80
End: 2025-02-25

## 2025-02-25 NOTE — TELEPHONE ENCOUNTER
----- Message from Dr. Teressa Serrato MD sent at 2/25/2025  4:14 PM EST -----  See below.  Call patient to be sure she gets this message and make sure she gets repeat labs in 2 weeks.  MER Saucedo, your labs suggest that you are getting too dehydrated.  I want to cut back your torsemide to just one tablet daily, especially while we are starting the Entresto.  I will have the office call you to be sure you received this message.  Teressa Serrato

## 2025-02-27 ENCOUNTER — HOSPITAL ENCOUNTER (OUTPATIENT)
Dept: WOMENS IMAGING | Age: 80
Discharge: HOME | End: 2025-02-27
Payer: MEDICARE

## 2025-02-27 VITALS — BODY MASS INDEX: 30 KG/M2 | WEIGHT: 163 LBS | HEIGHT: 62 IN

## 2025-02-27 DIAGNOSIS — Z12.31 BREAST CANCER SCREENING BY MAMMOGRAM: ICD-10-CM

## 2025-02-27 PROCEDURE — 77063 BREAST TOMOSYNTHESIS BI: CPT

## 2025-03-11 ENCOUNTER — TELEPHONE (OUTPATIENT)
Dept: CARDIOLOGY CLINIC | Age: 80
End: 2025-03-11

## 2025-03-11 DIAGNOSIS — R06.02 SHORTNESS OF BREATH: ICD-10-CM

## 2025-03-11 DIAGNOSIS — I50.32 CHRONIC DIASTOLIC HEART FAILURE (HCC): Primary | ICD-10-CM

## 2025-03-11 NOTE — TELEPHONE ENCOUNTER
Patient went to have her lab work done that MER told her to get, but when she went to [facility] they did not see any ordered.  Please call her to let her know when the order is in so she can go back   [phone]

## 2025-03-12 DIAGNOSIS — I50.32 CHRONIC DIASTOLIC HEART FAILURE (HCC): ICD-10-CM

## 2025-03-12 DIAGNOSIS — R06.02 SHORTNESS OF BREATH: ICD-10-CM

## 2025-03-12 LAB
ANION GAP SERPL CALCULATED.3IONS-SCNC: 11 MMOL/L (ref 3–16)
BUN SERPL-MCNC: 27 MG/DL (ref 7–20)
CALCIUM SERPL-MCNC: 10 MG/DL (ref 8.3–10.6)
CHLORIDE SERPL-SCNC: 104 MMOL/L (ref 99–110)
CO2 SERPL-SCNC: 26 MMOL/L (ref 21–32)
CREAT SERPL-MCNC: 1.5 MG/DL (ref 0.6–1.2)
GFR SERPLBLD CREATININE-BSD FMLA CKD-EPI: 36 ML/MIN/{1.73_M2}
GLUCOSE SERPL-MCNC: 145 MG/DL (ref 70–99)
NT-PROBNP SERPL-MCNC: 199 PG/ML (ref 0–449)
POTASSIUM SERPL-SCNC: 3.8 MMOL/L (ref 3.5–5.1)
SODIUM SERPL-SCNC: 141 MMOL/L (ref 136–145)

## 2025-03-12 NOTE — TELEPHONE ENCOUNTER
Spoke to patient she is asking if she needs a TSH reflex to FT4, FT3  she is at the lab to get the BNP and BMP today. Does she need this done again?    Spoke to patient she verbalized understanding.

## 2025-03-13 ENCOUNTER — RESULTS FOLLOW-UP (OUTPATIENT)
Dept: CARDIOLOGY CLINIC | Age: 80
End: 2025-03-13

## 2025-03-21 ENCOUNTER — OFFICE VISIT (OUTPATIENT)
Dept: CARDIOLOGY CLINIC | Age: 80
End: 2025-03-21
Payer: MEDICARE

## 2025-03-21 VITALS
HEART RATE: 71 BPM | HEIGHT: 62 IN | OXYGEN SATURATION: 99 % | BODY MASS INDEX: 31.28 KG/M2 | DIASTOLIC BLOOD PRESSURE: 58 MMHG | SYSTOLIC BLOOD PRESSURE: 136 MMHG | WEIGHT: 170 LBS

## 2025-03-21 DIAGNOSIS — I50.32 CHRONIC DIASTOLIC HEART FAILURE (HCC): Primary | ICD-10-CM

## 2025-03-21 DIAGNOSIS — N28.9 RENAL INSUFFICIENCY: ICD-10-CM

## 2025-03-21 DIAGNOSIS — E55.9 VITAMIN D DEFICIENCY: ICD-10-CM

## 2025-03-21 DIAGNOSIS — I10 ESSENTIAL HYPERTENSION, BENIGN: ICD-10-CM

## 2025-03-21 DIAGNOSIS — E78.00 PURE HYPERCHOLESTEROLEMIA: ICD-10-CM

## 2025-03-21 PROCEDURE — 1159F MED LIST DOCD IN RCRD: CPT | Performed by: CLINICAL NURSE SPECIALIST

## 2025-03-21 PROCEDURE — 1090F PRES/ABSN URINE INCON ASSESS: CPT | Performed by: CLINICAL NURSE SPECIALIST

## 2025-03-21 PROCEDURE — G8400 PT W/DXA NO RESULTS DOC: HCPCS | Performed by: CLINICAL NURSE SPECIALIST

## 2025-03-21 PROCEDURE — 99214 OFFICE O/P EST MOD 30 MIN: CPT | Performed by: CLINICAL NURSE SPECIALIST

## 2025-03-21 PROCEDURE — G8417 CALC BMI ABV UP PARAM F/U: HCPCS | Performed by: CLINICAL NURSE SPECIALIST

## 2025-03-21 PROCEDURE — 1123F ACP DISCUSS/DSCN MKR DOCD: CPT | Performed by: CLINICAL NURSE SPECIALIST

## 2025-03-21 PROCEDURE — 3075F SYST BP GE 130 - 139MM HG: CPT | Performed by: CLINICAL NURSE SPECIALIST

## 2025-03-21 PROCEDURE — G8427 DOCREV CUR MEDS BY ELIG CLIN: HCPCS | Performed by: CLINICAL NURSE SPECIALIST

## 2025-03-21 PROCEDURE — 3078F DIAST BP <80 MM HG: CPT | Performed by: CLINICAL NURSE SPECIALIST

## 2025-03-21 PROCEDURE — 1036F TOBACCO NON-USER: CPT | Performed by: CLINICAL NURSE SPECIALIST

## 2025-03-21 RX ORDER — ALBUTEROL SULFATE 90 UG/1
2 INHALANT RESPIRATORY (INHALATION) EVERY 6 HOURS PRN
COMMUNITY

## 2025-03-21 NOTE — PROGRESS NOTES
smokeless tobacco. She reports that she does not drink alcohol and does not use drugs.   Family History:   Family History   Problem Relation Age of Onset    Arthritis Other     Cancer Other     Heart Disease Other     High Blood Pressure Other     Stroke Other        Home Medications:  Prior to Admission medications    Medication Sig Start Date End Date Taking? Authorizing Provider   albuterol sulfate HFA (VENTOLIN HFA) 108 (90 Base) MCG/ACT inhaler Inhale 2 puffs into the lungs every 6 hours as needed for Wheezing   Yes Ant Lopez MD   sacubitril-valsartan (ENTRESTO) 24-26 MG per tablet Take 1/2 tablet twice a day. 3/21/25  Yes Gisele Rivas APRN - CNS   gabapentin (NEURONTIN) 300 MG capsule Take 1 capsule by mouth 3 times daily.   Yes Ant Lopez MD   topiramate (TOPAMAX) 50 MG tablet Take 1 tablet by mouth 2 times daily   Yes Ant Lopez MD   empagliflozin (JARDIANCE) 10 MG tablet Take 1 tablet by mouth daily   Yes Ant Lopez MD   torsemide (DEMADEX) 20 MG tablet Take 1 tablet by mouth daily One extra tablet for weight gain or swelling   Yes ProvidernAt MD   CRANBERRY PO Take by mouth daily   Yes ProviderAnt MD   Multiple Vitamins-Minerals (THERAPEUTIC MULTIVITAMIN-MINERALS) tablet Take 1 tablet by mouth daily   Yes ProviderAnt MD   insulin glargine (LANTUS) 100 UNIT/ML injection vial Inject 10 Units into the skin nightly   Yes ProviderAnt MD   pravastatin (PRAVACHOL) 20 MG tablet Take 1 tablet by mouth nightly 4/26/18  Yes Everett Yepez MD   esomeprazole Magnesium (NEXIUM) 20 MG PACK Take 1 packet by mouth daily   Yes Ant Lopez MD   rOPINIRole (REQUIP) 2 MG tablet Take 2 tablets by mouth nightly   Yes ProviderAnt MD        Allergies:  Anastrozole, Femara [letrozole], Lisinopril, Tamoxifen, and Percocet [oxycodone-acetaminophen]     Review of Systems:   Constitutional: there has been no unanticipated

## 2025-03-21 NOTE — PATIENT INSTRUCTIONS
Recommend PCP changing jardiance to 25 mg once a day  Continue all current medications  I will send order for IV iron to infusion center  Lots of information regarding sodium and fluid restriction  RTO in 2 months

## 2025-03-24 DIAGNOSIS — D50.8 IRON DEFICIENCY ANEMIA SECONDARY TO INADEQUATE DIETARY IRON INTAKE: Primary | ICD-10-CM

## 2025-03-24 DIAGNOSIS — I50.32 CHRONIC DIASTOLIC HEART FAILURE (HCC): ICD-10-CM

## 2025-03-24 DIAGNOSIS — K90.9 INTESTINAL MALABSORPTION, UNSPECIFIED TYPE: ICD-10-CM

## 2025-03-24 RX ORDER — SODIUM CHLORIDE 0.9 % (FLUSH) 0.9 %
5-40 SYRINGE (ML) INJECTION PRN
OUTPATIENT
Start: 2025-03-24

## 2025-03-24 RX ORDER — SODIUM CHLORIDE 9 MG/ML
5-250 INJECTION, SOLUTION INTRAVENOUS PRN
OUTPATIENT
Start: 2025-03-24

## 2025-03-28 ENCOUNTER — TELEPHONE (OUTPATIENT)
Dept: CARDIOLOGY CLINIC | Age: 80
End: 2025-03-28

## 2025-03-28 NOTE — TELEPHONE ENCOUNTER
Please talk to me when you return.  Wendi Sabillon called back, and I wasn't sure why you were trying to increase it,    Cost?  DM management?  Or cardiac?    Let's discuss when you get back.    MER

## 2025-03-28 NOTE — TELEPHONE ENCOUNTER
Zita from Dr Sabillon's office is calling in regards to MER asking for pt's Jardiance to be increased. Dr Sabillon would like a call to discuss. Cell [phone]

## 2025-04-18 ENCOUNTER — HOSPITAL ENCOUNTER (OUTPATIENT)
Dept: ONCOLOGY | Age: 80
Setting detail: INFUSION SERIES
Discharge: HOME | End: 2025-04-18
Payer: MEDICARE

## 2025-04-18 VITALS
SYSTOLIC BLOOD PRESSURE: 108 MMHG | HEART RATE: 60 BPM | TEMPERATURE: 98.8 F | RESPIRATION RATE: 16 BRPM | DIASTOLIC BLOOD PRESSURE: 48 MMHG

## 2025-04-18 DIAGNOSIS — I50.32 CHRONIC DIASTOLIC HEART FAILURE (HCC): ICD-10-CM

## 2025-04-18 DIAGNOSIS — K90.9 INTESTINAL MALABSORPTION, UNSPECIFIED TYPE: Primary | ICD-10-CM

## 2025-04-18 DIAGNOSIS — D50.8 IRON DEFICIENCY ANEMIA SECONDARY TO INADEQUATE DIETARY IRON INTAKE: ICD-10-CM

## 2025-04-18 PROCEDURE — 99211 OFF/OP EST MAY X REQ PHY/QHP: CPT

## 2025-04-18 PROCEDURE — 2580000003 HC RX 258: Performed by: CLINICAL NURSE SPECIALIST

## 2025-04-18 PROCEDURE — 2500000003 HC RX 250 WO HCPCS: Performed by: CLINICAL NURSE SPECIALIST

## 2025-04-18 PROCEDURE — 96365 THER/PROPH/DIAG IV INF INIT: CPT

## 2025-04-18 PROCEDURE — 6360000002 HC RX W HCPCS: Performed by: CLINICAL NURSE SPECIALIST

## 2025-04-18 RX ORDER — SODIUM CHLORIDE 9 MG/ML
5-250 INJECTION, SOLUTION INTRAVENOUS PRN
Status: DISCONTINUED | OUTPATIENT
Start: 2025-04-18 | End: 2025-04-19 | Stop reason: HOSPADM

## 2025-04-18 RX ORDER — SODIUM CHLORIDE 0.9 % (FLUSH) 0.9 %
5-40 SYRINGE (ML) INJECTION PRN
Status: DISCONTINUED | OUTPATIENT
Start: 2025-04-18 | End: 2025-04-19 | Stop reason: HOSPADM

## 2025-04-18 RX ORDER — SODIUM CHLORIDE 0.9 % (FLUSH) 0.9 %
5-40 SYRINGE (ML) INJECTION PRN
Status: CANCELLED | OUTPATIENT
Start: 2025-04-25

## 2025-04-18 RX ORDER — SODIUM CHLORIDE 9 MG/ML
5-250 INJECTION, SOLUTION INTRAVENOUS PRN
Status: CANCELLED | OUTPATIENT
Start: 2025-04-25

## 2025-04-18 RX ADMIN — Medication 10 ML: at 09:47

## 2025-04-18 RX ADMIN — SODIUM CHLORIDE 120 ML/HR: 9 INJECTION, SOLUTION INTRAVENOUS at 09:48

## 2025-04-18 RX ADMIN — SODIUM CHLORIDE 125 MG: 9 INJECTION, SOLUTION INTRAVENOUS at 09:48

## 2025-04-18 NOTE — PROGRESS NOTES
Patient ambulatory to department for first of five doses of Ferrlecit. Tolerated venofer infusion well with no adverse rx noted.Given avs with information about Ferrlecit. Verbally told about most common possible side effects. To return next week for next infusion.

## 2025-04-25 ENCOUNTER — HOSPITAL ENCOUNTER (OUTPATIENT)
Dept: ONCOLOGY | Age: 80
Setting detail: INFUSION SERIES
Discharge: HOME | End: 2025-04-25
Payer: MEDICARE

## 2025-04-25 VITALS
SYSTOLIC BLOOD PRESSURE: 118 MMHG | HEART RATE: 61 BPM | RESPIRATION RATE: 16 BRPM | DIASTOLIC BLOOD PRESSURE: 40 MMHG | TEMPERATURE: 96.9 F

## 2025-04-25 DIAGNOSIS — I50.32 CHRONIC DIASTOLIC HEART FAILURE (HCC): ICD-10-CM

## 2025-04-25 DIAGNOSIS — K90.9 INTESTINAL MALABSORPTION, UNSPECIFIED TYPE: Primary | ICD-10-CM

## 2025-04-25 DIAGNOSIS — D50.8 IRON DEFICIENCY ANEMIA SECONDARY TO INADEQUATE DIETARY IRON INTAKE: ICD-10-CM

## 2025-04-25 PROCEDURE — 2580000003 HC RX 258: Performed by: CLINICAL NURSE SPECIALIST

## 2025-04-25 PROCEDURE — 96365 THER/PROPH/DIAG IV INF INIT: CPT

## 2025-04-25 PROCEDURE — 2500000003 HC RX 250 WO HCPCS: Performed by: CLINICAL NURSE SPECIALIST

## 2025-04-25 PROCEDURE — 99211 OFF/OP EST MAY X REQ PHY/QHP: CPT

## 2025-04-25 PROCEDURE — 6360000002 HC RX W HCPCS: Performed by: CLINICAL NURSE SPECIALIST

## 2025-04-25 RX ORDER — SODIUM CHLORIDE 9 MG/ML
5-250 INJECTION, SOLUTION INTRAVENOUS PRN
Status: CANCELLED | OUTPATIENT
Start: 2025-05-02

## 2025-04-25 RX ORDER — SODIUM CHLORIDE 0.9 % (FLUSH) 0.9 %
5-40 SYRINGE (ML) INJECTION PRN
Status: CANCELLED | OUTPATIENT
Start: 2025-05-02

## 2025-04-25 RX ORDER — SODIUM CHLORIDE 0.9 % (FLUSH) 0.9 %
5-40 SYRINGE (ML) INJECTION PRN
Status: DISCONTINUED | OUTPATIENT
Start: 2025-04-25 | End: 2025-04-26 | Stop reason: HOSPADM

## 2025-04-25 RX ORDER — SODIUM CHLORIDE 9 MG/ML
5-250 INJECTION, SOLUTION INTRAVENOUS PRN
Status: DISCONTINUED | OUTPATIENT
Start: 2025-04-25 | End: 2025-04-26 | Stop reason: HOSPADM

## 2025-04-25 RX ADMIN — SODIUM CHLORIDE 100 ML/HR: 9 INJECTION, SOLUTION INTRAVENOUS at 10:34

## 2025-04-25 RX ADMIN — SODIUM CHLORIDE 125 MG: 9 INJECTION, SOLUTION INTRAVENOUS at 10:34

## 2025-04-25 RX ADMIN — Medication 10 ML: at 10:34

## 2025-04-25 NOTE — PROGRESS NOTES
Patient ambulatory to department for second of five doses of Ferrlecit. Tolerated venofer infusion well with no adverse rx noted.Given avs with information about Ferrlecit. Verbally told about most common possible side effects. To return next week for next infusion.

## 2025-05-02 ENCOUNTER — HOSPITAL ENCOUNTER (OUTPATIENT)
Dept: ONCOLOGY | Age: 80
Setting detail: INFUSION SERIES
Discharge: HOME | End: 2025-05-02
Payer: MEDICARE

## 2025-05-02 VITALS
SYSTOLIC BLOOD PRESSURE: 102 MMHG | RESPIRATION RATE: 16 BRPM | DIASTOLIC BLOOD PRESSURE: 65 MMHG | HEART RATE: 61 BPM | TEMPERATURE: 97.2 F

## 2025-05-02 DIAGNOSIS — K90.9 INTESTINAL MALABSORPTION, UNSPECIFIED TYPE: Primary | ICD-10-CM

## 2025-05-02 DIAGNOSIS — I50.32 CHRONIC DIASTOLIC HEART FAILURE (HCC): ICD-10-CM

## 2025-05-02 DIAGNOSIS — D50.8 IRON DEFICIENCY ANEMIA SECONDARY TO INADEQUATE DIETARY IRON INTAKE: ICD-10-CM

## 2025-05-02 PROCEDURE — 2580000003 HC RX 258: Performed by: CLINICAL NURSE SPECIALIST

## 2025-05-02 PROCEDURE — 99211 OFF/OP EST MAY X REQ PHY/QHP: CPT

## 2025-05-02 PROCEDURE — 96365 THER/PROPH/DIAG IV INF INIT: CPT

## 2025-05-02 PROCEDURE — 6360000002 HC RX W HCPCS: Performed by: CLINICAL NURSE SPECIALIST

## 2025-05-02 RX ORDER — SODIUM CHLORIDE 9 MG/ML
5-250 INJECTION, SOLUTION INTRAVENOUS PRN
Status: DISCONTINUED | OUTPATIENT
Start: 2025-05-02 | End: 2025-05-03 | Stop reason: HOSPADM

## 2025-05-02 RX ORDER — SODIUM CHLORIDE 0.9 % (FLUSH) 0.9 %
5-40 SYRINGE (ML) INJECTION PRN
Status: DISCONTINUED | OUTPATIENT
Start: 2025-05-02 | End: 2025-05-03 | Stop reason: HOSPADM

## 2025-05-02 RX ORDER — SODIUM CHLORIDE 0.9 % (FLUSH) 0.9 %
5-40 SYRINGE (ML) INJECTION PRN
OUTPATIENT
Start: 2025-05-09

## 2025-05-02 RX ORDER — SODIUM CHLORIDE 9 MG/ML
5-250 INJECTION, SOLUTION INTRAVENOUS PRN
OUTPATIENT
Start: 2025-05-09

## 2025-05-02 RX ADMIN — SODIUM CHLORIDE 125 MG: 9 INJECTION, SOLUTION INTRAVENOUS at 10:28

## 2025-05-02 RX ADMIN — SODIUM CHLORIDE 110 ML/HR: 9 INJECTION, SOLUTION INTRAVENOUS at 10:26

## 2025-05-02 NOTE — PROGRESS NOTES
Patient ambulatory to department for third of five doses of Ferrlecit. Tolerated venofer infusion well with no adverse rx noted.Given avs with information about Ferrlecit. Verbally told about most common possible side effects. To return next week for next infusion.

## 2025-05-09 ENCOUNTER — HOSPITAL ENCOUNTER (OUTPATIENT)
Dept: ONCOLOGY | Age: 80
Setting detail: INFUSION SERIES
Discharge: HOME | End: 2025-05-09
Payer: MEDICARE

## 2025-05-09 VITALS
HEART RATE: 63 BPM | DIASTOLIC BLOOD PRESSURE: 52 MMHG | SYSTOLIC BLOOD PRESSURE: 101 MMHG | RESPIRATION RATE: 16 BRPM | TEMPERATURE: 97.8 F

## 2025-05-09 DIAGNOSIS — I50.32 CHRONIC DIASTOLIC HEART FAILURE (HCC): ICD-10-CM

## 2025-05-09 DIAGNOSIS — K90.9 INTESTINAL MALABSORPTION, UNSPECIFIED TYPE: Primary | ICD-10-CM

## 2025-05-09 DIAGNOSIS — D50.8 IRON DEFICIENCY ANEMIA SECONDARY TO INADEQUATE DIETARY IRON INTAKE: ICD-10-CM

## 2025-05-09 PROCEDURE — 6360000002 HC RX W HCPCS: Performed by: CLINICAL NURSE SPECIALIST

## 2025-05-09 PROCEDURE — 2580000003 HC RX 258: Performed by: CLINICAL NURSE SPECIALIST

## 2025-05-09 PROCEDURE — 99211 OFF/OP EST MAY X REQ PHY/QHP: CPT

## 2025-05-09 PROCEDURE — 96365 THER/PROPH/DIAG IV INF INIT: CPT

## 2025-05-09 RX ORDER — SODIUM CHLORIDE 0.9 % (FLUSH) 0.9 %
5-40 SYRINGE (ML) INJECTION PRN
Status: DISCONTINUED | OUTPATIENT
Start: 2025-05-09 | End: 2025-05-10 | Stop reason: HOSPADM

## 2025-05-09 RX ORDER — SODIUM CHLORIDE 9 MG/ML
5-250 INJECTION, SOLUTION INTRAVENOUS PRN
Status: CANCELLED | OUTPATIENT
Start: 2025-05-16

## 2025-05-09 RX ORDER — SODIUM CHLORIDE 9 MG/ML
5-250 INJECTION, SOLUTION INTRAVENOUS PRN
Status: DISCONTINUED | OUTPATIENT
Start: 2025-05-09 | End: 2025-05-10 | Stop reason: HOSPADM

## 2025-05-09 RX ORDER — SODIUM CHLORIDE 0.9 % (FLUSH) 0.9 %
5-40 SYRINGE (ML) INJECTION PRN
Status: CANCELLED | OUTPATIENT
Start: 2025-05-16

## 2025-05-09 RX ADMIN — SODIUM CHLORIDE 125 MG: 9 INJECTION, SOLUTION INTRAVENOUS at 10:33

## 2025-05-09 RX ADMIN — SODIUM CHLORIDE 110 ML/HR: 9 INJECTION, SOLUTION INTRAVENOUS at 10:33

## 2025-05-09 NOTE — PROGRESS NOTES
Patient ambulatory to department for fourth of five doses of Ferrlecit. Tolerated venofer infusion well with no adverse rx noted.Given avs with information about Ferrlecit. Verbally told about most common possible side effects. To return next week for next infusion.

## 2025-05-16 ENCOUNTER — HOSPITAL ENCOUNTER (OUTPATIENT)
Dept: ONCOLOGY | Age: 80
Setting detail: INFUSION SERIES
Discharge: HOME | End: 2025-05-16
Payer: MEDICARE

## 2025-05-16 VITALS
RESPIRATION RATE: 16 BRPM | DIASTOLIC BLOOD PRESSURE: 55 MMHG | SYSTOLIC BLOOD PRESSURE: 113 MMHG | HEART RATE: 67 BPM | TEMPERATURE: 96.8 F

## 2025-05-16 DIAGNOSIS — I50.32 CHRONIC DIASTOLIC HEART FAILURE (HCC): ICD-10-CM

## 2025-05-16 DIAGNOSIS — K90.9 INTESTINAL MALABSORPTION, UNSPECIFIED TYPE: Primary | ICD-10-CM

## 2025-05-16 DIAGNOSIS — D50.8 IRON DEFICIENCY ANEMIA SECONDARY TO INADEQUATE DIETARY IRON INTAKE: ICD-10-CM

## 2025-05-16 PROCEDURE — 2580000003 HC RX 258: Performed by: CLINICAL NURSE SPECIALIST

## 2025-05-16 PROCEDURE — 6360000002 HC RX W HCPCS: Performed by: CLINICAL NURSE SPECIALIST

## 2025-05-16 PROCEDURE — 99211 OFF/OP EST MAY X REQ PHY/QHP: CPT

## 2025-05-16 PROCEDURE — 96365 THER/PROPH/DIAG IV INF INIT: CPT

## 2025-05-16 RX ORDER — SODIUM CHLORIDE 9 MG/ML
5-250 INJECTION, SOLUTION INTRAVENOUS PRN
Status: CANCELLED | OUTPATIENT
Start: 2025-05-16

## 2025-05-16 RX ORDER — SODIUM CHLORIDE 0.9 % (FLUSH) 0.9 %
5-40 SYRINGE (ML) INJECTION PRN
Status: DISCONTINUED | OUTPATIENT
Start: 2025-05-16 | End: 2025-05-16

## 2025-05-16 RX ORDER — SODIUM CHLORIDE 0.9 % (FLUSH) 0.9 %
5-40 SYRINGE (ML) INJECTION PRN
Status: CANCELLED | OUTPATIENT
Start: 2025-05-16

## 2025-05-16 RX ORDER — SODIUM CHLORIDE 9 MG/ML
5-250 INJECTION, SOLUTION INTRAVENOUS PRN
Status: DISCONTINUED | OUTPATIENT
Start: 2025-05-16 | End: 2025-05-16

## 2025-05-16 RX ADMIN — SODIUM CHLORIDE 125 MG: 9 INJECTION, SOLUTION INTRAVENOUS at 10:05

## 2025-05-16 RX ADMIN — SODIUM CHLORIDE 110 ML/HR: 9 INJECTION, SOLUTION INTRAVENOUS at 10:04

## 2025-05-16 NOTE — PROGRESS NOTES
Patient ambulatory to department for last dose of Ferrlecit. Tolerated venofer infusion well with no adverse rx noted.Denied need for printed AVS. To follow up with MD

## 2025-05-23 ENCOUNTER — OFFICE VISIT (OUTPATIENT)
Dept: CARDIOLOGY CLINIC | Age: 80
End: 2025-05-23
Payer: MEDICARE

## 2025-05-23 VITALS
HEIGHT: 62 IN | SYSTOLIC BLOOD PRESSURE: 110 MMHG | DIASTOLIC BLOOD PRESSURE: 60 MMHG | HEART RATE: 72 BPM | WEIGHT: 164 LBS | BODY MASS INDEX: 30.18 KG/M2 | OXYGEN SATURATION: 98 %

## 2025-05-23 DIAGNOSIS — E78.00 PURE HYPERCHOLESTEROLEMIA: ICD-10-CM

## 2025-05-23 DIAGNOSIS — E55.9 VITAMIN D DEFICIENCY: ICD-10-CM

## 2025-05-23 DIAGNOSIS — I50.32 CHRONIC DIASTOLIC HEART FAILURE (HCC): Primary | ICD-10-CM

## 2025-05-23 DIAGNOSIS — N28.9 RENAL INSUFFICIENCY: ICD-10-CM

## 2025-05-23 DIAGNOSIS — I10 ESSENTIAL HYPERTENSION, BENIGN: ICD-10-CM

## 2025-05-23 PROCEDURE — G8427 DOCREV CUR MEDS BY ELIG CLIN: HCPCS | Performed by: CLINICAL NURSE SPECIALIST

## 2025-05-23 PROCEDURE — 99214 OFFICE O/P EST MOD 30 MIN: CPT | Performed by: CLINICAL NURSE SPECIALIST

## 2025-05-23 PROCEDURE — 3078F DIAST BP <80 MM HG: CPT | Performed by: CLINICAL NURSE SPECIALIST

## 2025-05-23 PROCEDURE — G8417 CALC BMI ABV UP PARAM F/U: HCPCS | Performed by: CLINICAL NURSE SPECIALIST

## 2025-05-23 PROCEDURE — 1123F ACP DISCUSS/DSCN MKR DOCD: CPT | Performed by: CLINICAL NURSE SPECIALIST

## 2025-05-23 PROCEDURE — 3074F SYST BP LT 130 MM HG: CPT | Performed by: CLINICAL NURSE SPECIALIST

## 2025-05-23 PROCEDURE — G8400 PT W/DXA NO RESULTS DOC: HCPCS | Performed by: CLINICAL NURSE SPECIALIST

## 2025-05-23 PROCEDURE — 1036F TOBACCO NON-USER: CPT | Performed by: CLINICAL NURSE SPECIALIST

## 2025-05-23 PROCEDURE — 1160F RVW MEDS BY RX/DR IN RCRD: CPT | Performed by: CLINICAL NURSE SPECIALIST

## 2025-05-23 PROCEDURE — 1090F PRES/ABSN URINE INCON ASSESS: CPT | Performed by: CLINICAL NURSE SPECIALIST

## 2025-05-23 PROCEDURE — 1159F MED LIST DOCD IN RCRD: CPT | Performed by: CLINICAL NURSE SPECIALIST

## 2025-05-23 NOTE — PROGRESS NOTES
mood, affect, memory, mentation, or behavior are noted    Lab Data:    CBC:   Lab Results   Component Value Date/Time    WBC 6.2 02/21/2025 12:50 PM    WBC 8.1 11/28/2017 09:49 AM    WBC 5.0 12/19/2014 08:15 AM    RBC 4.27 02/21/2025 12:50 PM    RBC 4.60 11/28/2017 09:49 AM    RBC 3.43 12/19/2014 08:15 AM    HGB 12.5 02/21/2025 12:50 PM    HGB 12.8 12/09/2017 04:53 AM    HGB 13.7 11/28/2017 09:49 AM    HCT 37.1 02/21/2025 12:50 PM    HCT 37.3 12/09/2017 04:53 AM    HCT 40.7 11/28/2017 09:49 AM    MCV 86.9 02/21/2025 12:50 PM    MCV 88.5 11/28/2017 09:49 AM    MCV 87.3 12/19/2014 08:15 AM    RDW 12.7 02/21/2025 12:50 PM    RDW 13.1 11/28/2017 09:49 AM    RDW 13.5 12/19/2014 08:15 AM     02/21/2025 12:50 PM     11/28/2017 09:49 AM     12/19/2014 08:15 AM     BMP:  Lab Results   Component Value Date/Time     03/12/2025 02:52 PM     02/21/2025 12:50 PM     11/28/2017 09:49 AM    K 3.8 03/12/2025 02:52 PM    K 4.1 02/21/2025 12:50 PM    K 4.3 11/28/2017 09:49 AM     03/12/2025 02:52 PM     02/21/2025 12:50 PM     11/28/2017 09:49 AM    CO2 26 03/12/2025 02:52 PM    CO2 27 02/21/2025 12:50 PM    CO2 26 11/28/2017 09:49 AM    BUN 27 03/12/2025 02:52 PM    BUN 30 02/21/2025 12:50 PM    BUN 16 11/28/2017 09:49 AM    CREATININE 1.5 03/12/2025 02:52 PM    CREATININE 1.5 02/21/2025 12:50 PM    CREATININE 0.7 11/28/2017 09:49 AM     BNP:   Lab Results   Component Value Date/Time    PROBNP 199 03/12/2025 02:52 PM    PROBNP 195 02/21/2025 12:50 PM     Cardiac Imaging:  ECHO 11/8/24 ([facility]): Reason for Study: hypotension, chest pain, SOB   Reason for Study: Rule out Effusion   Overall left ventricular ejection fraction is estimated to be 55-60%.   Left ventricular wall motion is normal.   Trace pericardial effusion.   Mild aortic regurgitation.   Mild mitral regurgitation.   Mitral inflow pattern is consistent with Diastolic Dysfunction.   Right ventricular systolic

## 2025-05-23 NOTE — PATIENT INSTRUCTIONS
Ok to take claritin or zyrtec at night time   Talk to PCP about ozempic or mounjaro  If BP continues to be <95 call me as we may decrease dose to once a day  Continue all current medications for now  RTO in 4 months

## 2025-06-16 DIAGNOSIS — I50.32 CHRONIC DIASTOLIC HEART FAILURE (HCC): ICD-10-CM

## 2025-06-16 LAB — NT-PROBNP SERPL-MCNC: 356 PG/ML (ref 0–449)

## 2025-06-17 ENCOUNTER — RESULTS FOLLOW-UP (OUTPATIENT)
Dept: CARDIOLOGY CLINIC | Age: 80
End: 2025-06-17

## 2025-07-25 ENCOUNTER — HOSPITAL ENCOUNTER (EMERGENCY)
Age: 80
Discharge: HOME | End: 2025-07-25
Attending: EMERGENCY MEDICINE
Payer: MEDICARE

## 2025-07-25 ENCOUNTER — APPOINTMENT (OUTPATIENT)
Dept: GENERAL RADIOLOGY | Age: 80
End: 2025-07-25
Payer: MEDICARE

## 2025-07-25 VITALS
BODY MASS INDEX: 30.22 KG/M2 | HEART RATE: 67 BPM | TEMPERATURE: 97.9 F | WEIGHT: 164.24 LBS | DIASTOLIC BLOOD PRESSURE: 26 MMHG | RESPIRATION RATE: 18 BRPM | SYSTOLIC BLOOD PRESSURE: 106 MMHG | OXYGEN SATURATION: 99 % | HEIGHT: 62 IN

## 2025-07-25 DIAGNOSIS — M25.512 ACUTE PAIN OF LEFT SHOULDER: Primary | ICD-10-CM

## 2025-07-25 LAB
ALBUMIN SERPL-MCNC: 4 G/DL (ref 3.4–5)
ALBUMIN/GLOB SERPL: 2.4 {RATIO} (ref 1.1–2.2)
ALP SERPL-CCNC: 81 U/L (ref 40–129)
ALT SERPL-CCNC: 11 U/L (ref 10–40)
ANION GAP SERPL CALCULATED.3IONS-SCNC: 10 MMOL/L (ref 3–16)
AST SERPL-CCNC: 20 U/L (ref 15–37)
BASOPHILS # BLD: 0 K/UL (ref 0–0.2)
BASOPHILS NFR BLD: 0.2 %
BILIRUB SERPL-MCNC: 0.4 MG/DL (ref 0–1)
BUN SERPL-MCNC: 26 MG/DL (ref 7–20)
CALCIUM SERPL-MCNC: 9.3 MG/DL (ref 8.3–10.6)
CHLORIDE SERPL-SCNC: 112 MMOL/L (ref 99–110)
CO2 SERPL-SCNC: 19 MMOL/L (ref 21–32)
CREAT SERPL-MCNC: 1.2 MG/DL (ref 0.6–1.2)
DEPRECATED RDW RBC AUTO: 14 % (ref 12.4–15.4)
EKG ATRIAL RATE: 71 BPM
EKG DIAGNOSIS: NORMAL
EKG P AXIS: 42 DEGREES
EKG P-R INTERVAL: 132 MS
EKG Q-T INTERVAL: 430 MS
EKG QRS DURATION: 130 MS
EKG QTC CALCULATION (BAZETT): 467 MS
EKG R AXIS: -31 DEGREES
EKG T AXIS: 14 DEGREES
EKG VENTRICULAR RATE: 71 BPM
EOSINOPHIL # BLD: 0.1 K/UL (ref 0–0.6)
EOSINOPHIL NFR BLD: 0.6 %
GFR SERPLBLD CREATININE-BSD FMLA CKD-EPI: 46 ML/MIN/{1.73_M2}
GLUCOSE SERPL-MCNC: 142 MG/DL (ref 70–99)
HCT VFR BLD AUTO: 40.8 % (ref 36–48)
HGB BLD-MCNC: 13.1 G/DL (ref 12–16)
IMM GRANULOCYTES # BLD: 0.1 K/UL (ref 0–0.2)
IMM GRANULOCYTES NFR BLD: 0.6 %
LYMPHOCYTES # BLD: 1.2 K/UL (ref 1–5.1)
LYMPHOCYTES NFR BLD: 12.7 %
MCH RBC QN AUTO: 28.5 PG (ref 26–34)
MCHC RBC AUTO-ENTMCNC: 32.1 G/DL (ref 32–36.4)
MCV RBC AUTO: 88.7 FL (ref 80–100)
MONOCYTES # BLD: 0.6 K/UL (ref 0–1.3)
MONOCYTES NFR BLD: 6.3 %
NEUTROPHILS # BLD: 7.4 K/UL (ref 1.7–7.7)
NEUTROPHILS NFR BLD: 79.6 %
PLATELET # BLD AUTO: 197 K/UL (ref 135–450)
PMV BLD AUTO: 9.5 FL (ref 5–10.5)
POTASSIUM SERPL-SCNC: 4.5 MMOL/L (ref 3.5–5.1)
PROT SERPL-MCNC: 5.7 G/DL (ref 6.4–8.2)
RBC # BLD AUTO: 4.6 M/UL (ref 4–5.2)
SODIUM SERPL-SCNC: 141 MMOL/L (ref 136–145)
TROPONIN, HIGH SENSITIVITY: 20 NG/L (ref 0–14)
TROPONIN, HIGH SENSITIVITY: 22 NG/L (ref 0–14)
WBC # BLD AUTO: 9.4 K/UL (ref 4–11)

## 2025-07-25 PROCEDURE — 99285 EMERGENCY DEPT VISIT HI MDM: CPT

## 2025-07-25 PROCEDURE — 84484 ASSAY OF TROPONIN QUANT: CPT

## 2025-07-25 PROCEDURE — 93010 ELECTROCARDIOGRAM REPORT: CPT | Performed by: INTERNAL MEDICINE

## 2025-07-25 PROCEDURE — 73030 X-RAY EXAM OF SHOULDER: CPT

## 2025-07-25 PROCEDURE — 71046 X-RAY EXAM CHEST 2 VIEWS: CPT

## 2025-07-25 PROCEDURE — 85025 COMPLETE CBC W/AUTO DIFF WBC: CPT

## 2025-07-25 PROCEDURE — 93005 ELECTROCARDIOGRAM TRACING: CPT | Performed by: EMERGENCY MEDICINE

## 2025-07-25 PROCEDURE — 36415 COLL VENOUS BLD VENIPUNCTURE: CPT

## 2025-07-25 PROCEDURE — 80053 COMPREHEN METABOLIC PANEL: CPT

## 2025-07-25 RX ORDER — IBUPROFEN 600 MG/1
600 TABLET, FILM COATED ORAL 3 TIMES DAILY PRN
Qty: 30 TABLET | Refills: 0 | Status: SHIPPED | OUTPATIENT
Start: 2025-07-25

## 2025-07-25 RX ORDER — CYCLOBENZAPRINE HCL 10 MG
10 TABLET ORAL 3 TIMES DAILY PRN
Qty: 21 TABLET | Refills: 0 | Status: SHIPPED | OUTPATIENT
Start: 2025-07-25 | End: 2025-08-04

## 2025-07-25 ASSESSMENT — PAIN DESCRIPTION - LOCATION: LOCATION: SHOULDER;NECK

## 2025-07-25 ASSESSMENT — PAIN DESCRIPTION - ORIENTATION: ORIENTATION: LEFT

## 2025-07-25 ASSESSMENT — PAIN DESCRIPTION - PAIN TYPE: TYPE: ACUTE PAIN

## 2025-07-25 ASSESSMENT — PAIN - FUNCTIONAL ASSESSMENT
PAIN_FUNCTIONAL_ASSESSMENT: 0-10
PAIN_FUNCTIONAL_ASSESSMENT: 0-10

## 2025-07-25 ASSESSMENT — PAIN SCALES - GENERAL
PAINLEVEL_OUTOF10: 10
PAINLEVEL_OUTOF10: 0

## 2025-07-25 ASSESSMENT — PAIN DESCRIPTION - DESCRIPTORS: DESCRIPTORS: ACHING

## 2025-07-25 ASSESSMENT — PAIN DESCRIPTION - FREQUENCY: FREQUENCY: CONTINUOUS

## 2025-07-25 NOTE — ED NOTES
Fall risk screening completed.  Fall risk bracelet applied to patient.  Pt wanted to keep her street shoes on .  The fall risk is indicated using  fall sign .  Based on score, a bed alarm was not indicated.  The call light is within the patient's reach, and instructions for use were provided.  The bed is in the lowest position with wheels locked.  The patient has been advised to notify staff, using the call light, if there is a need to get up or use restroom.  The patient verbalized understanding of safety precautions and how to contact staff for assistance.

## 2025-07-25 NOTE — ED PROVIDER NOTES
[facility]  EMERGENCY DEPARTMENT ENCOUNTER      Pt Name: Payton Guevara  MRN: [id number]  Birthdate 1945  Date of evaluation: 7/25/2025  Provider: CROW NAILS DO    CHIEF COMPLAINT  Chief Complaint   Patient presents with    Shoulder Pain    Neck Pain     C/o left shoulder, left neck, and collar bone pain. Hurts with movement. X 1 week.  She denies any injury. She has been taking Tylenol one per day x 2 days          This patient is at risk for a communicable infection.  Therefore, personal protection equipment consisting of a mask was worn for the exam.    HPI  Payton Guevara is a 79 y.o. female who presents with left shoulder, left neck and left upper chest pain anteriorly.  She states it radiates into her back and to her neck.  She denies any previous history of heart attack.  She does have a history of congestive heart failure.  She denies any injuries.  Movement makes it worse and rest makes it better.  She describes it as achy and moderate.  She denies any previous history of DVT or pulmonary embolus.  She states that been present for 5 days (since Monday).  She states that hurts to turn her head.  She denies any shortness of breath.  She denies any fevers or chills.    REVIEW OF SYSTEMS  All systems negative except as noted in the HPI.  Reviewed Nurses' notes and concur.    No LMP recorded. Patient has had a hysterectomy.    PAST MEDICAL HISTORY  Past Medical History:   Diagnosis Date    Achalasia     Anemia     Arthritis     Breast cancer (HCC) 2011    left breast mastectomy    Clostridium difficile infection 2/25/14    Diabetes mellitus (HCC)     diet controlled    HBP (high blood pressure)     Hearing impairment     Hyperlipidemia     Nausea & vomiting     PONV (postoperative nausea and vomiting)     Sinus disorder     Tremor of both hands     Wears glasses     Wears hearing aid     bilat    Weight disorder        FAMILY HISTORY  Family History   Problem Relation Age

## 2025-07-25 NOTE — DISCHARGE INSTRUCTIONS
You may take Tylenol (acetaminophen) with the medications that are prescribed for you for pain or fever, if you are permitted to take these medications. Please follow package directions for the appropriate dosing and frequency.     You have been prescribed medication(s) (cyclobenzaprine) that may cause drowsiness. Do not be driving, operating heavy machinery, swimming, caring for small children, or engaging in dangerous activities of any type until these medications have worn off. This may be as long as 6-8 hours.    Wear your sling for comfort. If it increases your pain, then you may discontinue its use, and contact your physician for further instructions

## 2025-07-25 NOTE — ED NOTES
Discharge and education instructions reviewed with patient. Teach-back successful.  Pt verbalized understanding. Pt denied questions at this time. No acute distress noted. Patient instructed to follow-up as noted - return to emergency department if symptoms worsen. Patient verbalized understanding. Discharged per EDMD with discharge instructions. Pt discharged to private vehicle. Patient stable upon departure. Thanked patient for choosing [facility] [place] care.

## 2025-08-01 ENCOUNTER — OFFICE VISIT (OUTPATIENT)
Dept: CARDIOLOGY CLINIC | Age: 80
End: 2025-08-01
Payer: MEDICARE

## 2025-08-01 VITALS
BODY MASS INDEX: 30 KG/M2 | WEIGHT: 163 LBS | HEIGHT: 62 IN | SYSTOLIC BLOOD PRESSURE: 100 MMHG | OXYGEN SATURATION: 97 % | DIASTOLIC BLOOD PRESSURE: 50 MMHG | HEART RATE: 75 BPM

## 2025-08-01 DIAGNOSIS — I10 ESSENTIAL HYPERTENSION, BENIGN: ICD-10-CM

## 2025-08-01 DIAGNOSIS — R42 DIZZINESS: ICD-10-CM

## 2025-08-01 DIAGNOSIS — R00.2 PALPITATIONS: ICD-10-CM

## 2025-08-01 DIAGNOSIS — I50.32 CHRONIC DIASTOLIC HEART FAILURE (HCC): Primary | ICD-10-CM

## 2025-08-01 PROCEDURE — 1036F TOBACCO NON-USER: CPT | Performed by: CLINICAL NURSE SPECIALIST

## 2025-08-01 PROCEDURE — 1123F ACP DISCUSS/DSCN MKR DOCD: CPT | Performed by: CLINICAL NURSE SPECIALIST

## 2025-08-01 PROCEDURE — G8417 CALC BMI ABV UP PARAM F/U: HCPCS | Performed by: CLINICAL NURSE SPECIALIST

## 2025-08-01 PROCEDURE — 3074F SYST BP LT 130 MM HG: CPT | Performed by: CLINICAL NURSE SPECIALIST

## 2025-08-01 PROCEDURE — 3078F DIAST BP <80 MM HG: CPT | Performed by: CLINICAL NURSE SPECIALIST

## 2025-08-01 PROCEDURE — G8400 PT W/DXA NO RESULTS DOC: HCPCS | Performed by: CLINICAL NURSE SPECIALIST

## 2025-08-01 PROCEDURE — 1159F MED LIST DOCD IN RCRD: CPT | Performed by: CLINICAL NURSE SPECIALIST

## 2025-08-01 PROCEDURE — G8427 DOCREV CUR MEDS BY ELIG CLIN: HCPCS | Performed by: CLINICAL NURSE SPECIALIST

## 2025-08-01 PROCEDURE — 1160F RVW MEDS BY RX/DR IN RCRD: CPT | Performed by: CLINICAL NURSE SPECIALIST

## 2025-08-01 PROCEDURE — 99214 OFFICE O/P EST MOD 30 MIN: CPT | Performed by: CLINICAL NURSE SPECIALIST

## 2025-08-01 PROCEDURE — 1090F PRES/ABSN URINE INCON ASSESS: CPT | Performed by: CLINICAL NURSE SPECIALIST

## 2025-08-01 RX ORDER — SACUBITRIL AND VALSARTAN 24; 26 MG/1; MG/1
TABLET, FILM COATED ORAL
Qty: 90 TABLET | Refills: 1
Start: 2025-08-01

## 2025-08-01 NOTE — PATIENT INSTRUCTIONS
Event monitor for 2 weeks  Decrease entresto to half a pill at dinner time  Continue all other medications  RTO sept 26 th

## 2025-08-19 ENCOUNTER — OFFICE VISIT (OUTPATIENT)
Dept: ORTHOPEDIC SURGERY | Age: 80
End: 2025-08-19
Payer: MEDICARE

## 2025-08-19 VITALS — HEIGHT: 62 IN | BODY MASS INDEX: 30 KG/M2 | WEIGHT: 163 LBS

## 2025-08-19 DIAGNOSIS — M25.521 RIGHT ELBOW PAIN: Primary | ICD-10-CM

## 2025-08-19 PROCEDURE — 1090F PRES/ABSN URINE INCON ASSESS: CPT | Performed by: PHYSICIAN ASSISTANT

## 2025-08-19 PROCEDURE — G8427 DOCREV CUR MEDS BY ELIG CLIN: HCPCS | Performed by: PHYSICIAN ASSISTANT

## 2025-08-19 PROCEDURE — 1159F MED LIST DOCD IN RCRD: CPT | Performed by: PHYSICIAN ASSISTANT

## 2025-08-19 PROCEDURE — 99213 OFFICE O/P EST LOW 20 MIN: CPT | Performed by: PHYSICIAN ASSISTANT

## 2025-08-19 PROCEDURE — G8400 PT W/DXA NO RESULTS DOC: HCPCS | Performed by: PHYSICIAN ASSISTANT

## 2025-08-19 PROCEDURE — 1123F ACP DISCUSS/DSCN MKR DOCD: CPT | Performed by: PHYSICIAN ASSISTANT

## 2025-08-19 PROCEDURE — G8417 CALC BMI ABV UP PARAM F/U: HCPCS | Performed by: PHYSICIAN ASSISTANT

## 2025-08-19 PROCEDURE — 1036F TOBACCO NON-USER: CPT | Performed by: PHYSICIAN ASSISTANT

## 2025-08-19 PROCEDURE — 1125F AMNT PAIN NOTED PAIN PRSNT: CPT | Performed by: PHYSICIAN ASSISTANT

## 2025-08-26 ENCOUNTER — TELEPHONE (OUTPATIENT)
Dept: CARDIOLOGY CLINIC | Age: 80
End: 2025-08-26

## 2025-08-26 RX ORDER — CARVEDILOL 3.12 MG/1
3.12 TABLET ORAL 2 TIMES DAILY
Qty: 180 TABLET | Refills: 1 | Status: SHIPPED | OUTPATIENT
Start: 2025-08-26